# Patient Record
Sex: FEMALE | Race: WHITE | Employment: OTHER | URBAN - METROPOLITAN AREA
[De-identification: names, ages, dates, MRNs, and addresses within clinical notes are randomized per-mention and may not be internally consistent; named-entity substitution may affect disease eponyms.]

---

## 2017-01-10 ENCOUNTER — APPOINTMENT (OUTPATIENT)
Dept: PHYSICAL THERAPY | Age: 77
End: 2017-01-10

## 2017-01-13 ENCOUNTER — HOSPITAL ENCOUNTER (OUTPATIENT)
Dept: PHYSICAL THERAPY | Age: 77
Discharge: HOME | End: 2017-01-13
Payer: MEDICARE

## 2017-01-13 PROCEDURE — 97112 NEUROMUSCULAR REEDUCATION: CPT

## 2017-01-13 PROCEDURE — G8978 MOBILITY CURRENT STATUS: HCPCS

## 2017-01-13 PROCEDURE — 97162 PT EVAL MOD COMPLEX 30 MIN: CPT

## 2017-01-13 PROCEDURE — G8979 MOBILITY GOAL STATUS: HCPCS

## 2017-01-13 PROCEDURE — 97016 VASOPNEUMATIC DEVICE THERAPY: CPT

## 2017-01-13 NOTE — PROGRESS NOTES
PT DAILY TREATMENT NOTE - [facility]     Patient Name: Quinten Mayberry  Date:2017  : 1940  [x]  Patient  Verified  Payor: Александр Solders / Plan: [place] MED[place] PART [place] & B / Product Type: Medicare /    In time:210  Out time:305  Total Treatment Time (min): 55  Total Timed Codes (min): 8  1:1 Treatment Time ( only): 39   Visit #: 1 of 8    Treatment Area: Chronic pain of right knee [M25.561, G89.29]    SUBJECTIVE  Pain Level (0-10 scale): 6/10 seated at rest  Any medication changes, allergies to medications, adverse drug reactions, diagnosis change, or new procedure performed?: [x] No    [] Yes (see summary sheet for update)  Subjective functional status/changes:   [] No changes reported  See POC    OBJECTIVE    Modality rationale: decrease edema, decrease inflammation and decrease pain to improve the patients ability to increase activity/position tolerance   Min Type Additional Details    [] Estim:  []Unatt       []IFC  []Premod                        []Other:  []w/ice   []w/heat  Position:  Location:    [] Estim: []Att    []TENS instruct  []NMES                    []Other:  []w/US   []w/ice   []w/heat  Position:  Location:    []  Traction: [] Cervical       []Lumbar                       [] Prone          []Supine                       []Intermittent   []Continuous Lbs:  [] before manual  [] after manual    []  Ultrasound: []Continuous   [] Pulsed                           []1MHz   []3MHz W/cm2:  Location:    []  Iontophoresis with dexamethasone         Location: [] Take home patch   [] In clinic    []  Ice     []  heat  []  Ice massage  []  Laser   []  Anodyne Position:  Location:    []  Laser with stim  []  Other:  Position:  Location:   10 [x]  Vasopneumatic Device Pressure:       [] lo [x] med [] hi   Temperature: [x] lo [] med [] hi   [] Skin assessment post-treatment:  []intact []redness- no adverse reaction    []redness  adverse reaction:     37 min [x]Eval                  []Re-Eval        min Therapeutic Exercise:  [] See flow sheet :        min Therapeutic Activity:  []  See flow sheet :        8 min Neuromuscular Re-education:  []  See flow sheet : KT application to right knee, 3 I strips for stability/support   Rationale: increase proprioception and decrease pain  to improve the patients ability to normalize gait and function     min Manual Therapy:        min Gait Training:  ___ feet with ___ device on level surfaces with ___ level of assist   Rationale: With   [] TE   [] TA   [] neuro   [] other: Patient Education: [x] Review HEP    [] Progressed/Changed HEP based on:   [] positioning   [] body mechanics   [] transfers   [] heat/ice application    [] other:      Other Objective/Functional Measures:   Physical Therapy Evaluation - Knee  Chronic right knee pain with pt reporting severe arthritis from xray. Pain worsened with sitting and lying down.  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  PLOF: chronic knee pain with ADL's  Present Functional Limitations: sitting for prolonged periods      Gait:  [] Normal    [] Abnormal    [x] Antalgic    [] NWB    Device: none    Describe:    ROM / Strength  [] Unable to assess                  AROM                      PROM                   Strength (1-5)    Left Right Left Right Left Right   Hip Flexion     4/5 4-/5    Extension          Abduction          Adduction         Knee Flexion 125 90   4+/5 4/5    Extension 3 20   4+/5 4/5   Ankle Plantarflexion          Dorsiflexion     5/5 5/5       Flexibility: [] Unable to assess at this time  Hamstrings:    (L) Tightness= [] WNL   [] Min   [] Mod   [x] Severe  30 degrees    (R) Tightness= [] WNL   [] Min   [] Mod   [x] Severe  35 degrees  Quadriceps:    (L) Tightness= [] WNL   [] Min   [] Mod   [] Severe    (R) Tightness= [] WNL   [] Min   [] Mod   [] Severe  Gastroc:      (L) Tightness= [] WNL   [] Min   [] Mod   [] Severe    (R) Tightness= [] WNL   [] Min   [] Mod   [] Severe  Other:    Palpation:   Neg/Pos  Neg/Pos  Neg/Pos   Joint Line + Quad tendon  Patellar ligament    Patella  Fibular head  Pes Anserinus    Tibial tubercle  Hamstring tendons  Infrapatellar fat pad      Optional Tests:  Patellar Positioning (Static)   []L []R Normal []L []R Lateral   []L []R Tedra Likens      []L []R Medial   []L []R [place]    Patellar Tracking   []L []R Glide (Lat)   []L []R Tilt (Lat)     []L []R Glide (Med)  []L []R Tilt (Med)      []L []R Tile (Inf)     Patellar Mobility   []L []R Hypermobile []L []R Hypomobile         Girth Measurements:     Cm at  Cm above joint line   Cm at   Cm below joint line  Cm at joint line   Left     49.5   Right      50     Lachmans  [] Neg    [] Pos Posterior Drawer [] Neg    [] Pos  Pivot Shift  [] Neg    [] Pos Posterior Sag  [] Neg    [] Pos  TANISHA   [] Neg    [] Pos Inga's Test [] Neg    [] Pos  ALRI   [] Neg    [] Pos Squat   [] Neg    [] Pos  Valgus@ 0 Degrees [] Neg    [] Pos Elayne-Shahid [] Neg    [] Pos  Valgus@ 30 Degrees [] Neg    [] Pos Patellar Apprehension [] Neg    [] Pos  Varus@ 0 Degrees [] Neg    [] Pos Cosby's Compression [] Neg    [] Pos  Varus@ 30 Degrees [] Neg    [] Pos Ely's Test  [] Neg    [] Pos  Apley's Compression [] Neg    [] Pos Jeremiah's Test  [] Neg    [] Pos  Apley's Distraction [] Neg    [] Pos Stroke Test  [] Neg    [] Pos   Anterior Drawer [] Neg    [] Pos Fluctuation Test [] Neg    [] Pos  Other:                  [] Neg    [] Pos                 Other tests/comments:         Pain Level (0-10 scale) post treatment: 4/10    ASSESSMENT/Changes in Function: see POC    Patient will continue to benefit from skilled PT services to modify and progress therapeutic interventions, address functional mobility deficits, address ROM deficits, address strength deficits, analyze and address soft tissue restrictions, analyze and cue movement patterns and assess and modify postural abnormalities to attain remaining goals.      [x]  See Plan of Care  []  See progress note/recertification  []  See Discharge Summary         Progress towards goals / Updated goals:  See POC    PLAN  [x]  Upgrade activities as tolerated     [x]  Continue plan of care  []  Update interventions per flow sheet       []  Discharge due to:_  [x]  Other: ROM/stretching, strengthening/stability, mod prn, taping      Jennifer Rodas, PT 1/13/2017  2:06 PM    No future appointments.

## 2017-01-13 NOTE — PROGRESS NOTES
[facility] at [address] [place] [address]  Phone: [phone]   Fax: [phone]    Plan of Care/ Statement of Necessity for Physical Therapy Services    Patient name: Christin Cordoba Start of Care: 2017   Referral source: Gracie Liu MD : 1940    Medical Diagnosis: Chronic pain of right knee [M25.561, G89.29]   Onset Date:chronic    Treatment Diagnosis: right knee pain   Prior Hospitalization: see medical history Provider#: [id number]   Medications: Verified on Patient summary List    Comorbidities: arthritis, thyroid problems   Prior Level of Function: chronic knee pain with ADL's      The Plan of Care and following information is based on the information from the initial evaluation. Assessment/ key information: Pt is a 67 yo female presenting to clinic with c/o chronic right knee pain with pt reporting severe arthritis on xray. 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 On exam pt has decreased right knee AROM: 20-90, significant HS tightness, right>left, decreased right knee strength, and moderate to significant tenderness to palpation about medial and lateral knee structures. She ambulates with antalgic gait pattern right LE. Evaluation Complexity History MEDIUM  Complexity : 1-2 comorbidities / personal factors will impact the outcome/ POC ; Examination MEDIUM Complexity : 3 Standardized tests and measures addressing body structure, function, activity limitation and / or participation in recreation  ;Presentation MEDIUM Complexity : Evolving with changing characteristics  ; Clinical Decision Making MEDIUM Complexity : FOTO score of 26-74  Overall Complexity Rating: MEDIUM  Problem List: pain affecting function, decrease ROM, decrease strength, edema affecting function, impaired gait/ balance, decrease ADL/ functional abilitiies, decrease activity tolerance and decrease flexibility/ joint mobility   Treatment Plan may include any combination of the following: Therapeutic exercise, Therapeutic activities, Neuromuscular re-education, Physical agent/modality, Gait/balance training, Manual therapy, Aquatic therapy and Patient education  Patient / Family readiness to learn indicated by: asking questions, trying to perform skills and interest  Persons(s) to be included in education: patient (P)  Barriers to Learning/Limitations: None  Patient Goal (s): pain relief and some flexibility  Patient Self Reported Health Status: good  Rehabilitation Potential: good    Short Term Goals: To be accomplished in 2 weeks:  1. Patient will be independent and compliant with HEP to achieve other goals. Status at eval: not independent/compliant  2. Increase right knee AROM ext </= 10 degrees to normalize gait. Status at eval: 20 degrees    Long Term Goals: To be accomplished in 4 weeks:  1. Improve FOTO score to >/= 53/100 to indicate decreased pain with ADL's. Status at eval: 32/100  2. Increase right knee AROM flex >/= 110 degrees to normalize ADL's. Status at eval: 90 degrees  3. Increase right HS flexibility by >/= 10 degrees to normalize function. Status at eval: 35 degrees  4. Increase right knee strength >/= 4+/5 to normalize function. Status at eval: 4/5    Frequency / Duration: Patient to be seen 2 times per week for 4 weeks. Patient/ Caregiver education and instruction: Diagnosis, prognosis, other ice application, purpose of KT application   [x]  Plan of care has been reviewed with PTA    G-Codes (GP)  Mobility   Current  CL= 60-79%   Goal  CK= 40-59%    The severity rating is based on clinical judgment and the FOTO score. Certification Period: 1/13/17 - 2/11/17  Renee Liao, PT 1/13/2017 3:08 PM  _____________________________________________________________________  I certify that the above Therapy Services are being furnished while the patient is under my care.  I agree with the treatment plan and certify that this therapy is necessary.     Physician's Signature:____________________  Date:__________Time:______    Please sign and return to   [facility] at [address] [place] [address]  Phone: [phone]   Fax: [phone]

## 2017-01-18 ENCOUNTER — HOSPITAL ENCOUNTER (OUTPATIENT)
Dept: PHYSICAL THERAPY | Age: 77
Discharge: HOME | End: 2017-01-18
Payer: MEDICARE

## 2017-01-18 PROCEDURE — 97110 THERAPEUTIC EXERCISES: CPT

## 2017-01-18 PROCEDURE — 97140 MANUAL THERAPY 1/> REGIONS: CPT

## 2017-01-18 NOTE — PROGRESS NOTES
PT DAILY TREATMENT NOTE - [facility]     Patient Name: Noreen Apple  Date:2017  : 1940  [x]  Patient  Verified  Payor: Hung Leyva / Plan: [place] MEDICARE PART A & B / Product Type: Medicare /    In time: 2:05  Out time: 3:20  Total Treatment Time (min): 75  Total Timed Codes (min): 60  1:1 Treatment Time ( [address] only): 40   Visit #: 2 of 8  Treatment Area: Chronic pain of right knee [M25.561, G89.29]    SUBJECTIVE  Pain Level (0-10 scale): 5  Any medication changes, allergies to medications, adverse drug reactions, diagnosis change, or new procedure performed?: [x] No    [] Yes (see summary sheet for update)  Subjective functional status/changes:   [] No changes reported  \"The tape really helped last time. It came off yesterday. The compression and ice really helped a lot too. I had less swelling\". OBJECTIVE    Modality rationale: decrease edema, decrease inflammation and decrease pain to improve the patients ability to perform functional mobility/ADLs and attain goals.    Min Type Additional Details    [] Estim:  []Unatt       []IFC  []Premod                        []Other:  []w/ice   []w/heat  Position:  Location:    [] Estim: []Att    []TENS instruct  []NMES                    []Other:  []w/US   []w/ice   []w/heat  Position:  Location:    []  Traction: [] Cervical       []Lumbar                       [] Prone          []Supine                       []Intermittent   []Continuous Lbs:  [] before manual  [] after manual    []  Ultrasound: []Continuous   [] Pulsed                           []1MHz   []3MHz W/cm2:  Location:    []  Iontophoresis with dexamethasone         Location: [] Take home patch   [] In clinic    []  Ice     []  heat  []  Ice massage  []  Laser   []  Anodyne Position:  Location:    []  Laser with stim  []  Other:  Position:  Location:   10 [x]  Vasopneumatic Device Pressure:       [] lo [x] med [] hi   Temperature: [x] lo [] med [] hi   [] Skin assessment post-treatment:  []intact []redness- no adverse reaction    []redness  adverse reaction:         25 (45) min Therapeutic Exercise:  [x] See flow sheet :   Rationale: increase ROM and increase strength to improve the patients ability to perform functional mobility/ADLs and attain goals. 15 min Manual Therapy:  STM to right hamstrings/gastroc in prone. Manual quad stretch 2x30\" in prone. Patellar mobs all planes grade 2. PROM knee flex/ext in supine. K-Taping to right knee joint; 3 \"I\" strips for stability/support   Rationale: decrease pain, increase ROM and increase tissue extensibility to perform functional mobility/ADLs and attain goals. With   [] TE   [] TA   [] neuro   [] other: Patient Education: [x] Review HEP    [] Progressed/Changed HEP based on:   [] positioning   [] body mechanics   [] transfers   [] heat/ice application    [] other:      Other Objective/Functional Measures:   -new exercises added as per flow sheet with vc's provided for form/technique 100% of the time.   -significant hypertonicity to medial>lateral hamstrings and gastrocs (addressed with manual treatment)      Pain Level (0-10 scale) post treatment: 4    ASSESSMENT/Changes in Function: Pt with significant hip weakness as per inability to achieve LE clearance from table with SLR extension and c/o fatigue/pulling. Limited AROM tolerance today, however noted improvement post manual treatment and good tolerance for PROM, though decreased tolerance for manual stretches and STM. Pt noted to have antalgic gait pattern during session and advised on benefit of ambulating with SPC to normalize gait and reduce secondary pain and compensations.      Patient will continue to benefit from skilled PT services to modify and progress therapeutic interventions, address functional mobility deficits, address ROM deficits, address strength deficits, analyze and address soft tissue restrictions, analyze and cue movement patterns and analyze and modify body mechanics/ergonomics to attain remaining goals. []  See Plan of Care  []  See progress note/recertification  []  See Discharge Summary         Progress towards goals / Updated goals:  Current goals in progress; eval completed last session    PLAN  []  Upgrade activities as tolerated     [x]  Continue plan of care  []  Update interventions per flow sheet       []  Discharge due to:_  []  Other:_      Gregorio Mcclure, PT 1/18/2017  12:30 PM    Future Appointments  Date Time Provider Paul Elam   1/18/2017 2:00 PM [facility] PT [facility]   1/20/2017 8:30 AM Jennifer Rodas PT [facility]   1/23/2017 2:30 PM Esha Loja PTA [facility]   1/25/2017 2:30 PM [address] [place] [address] [facility]   1/30/2017 1:00 PM PALMA FarrellHPMIRIAN [facility]   2/2/2017 1:00 PM Missael Rothman PT [facility]

## 2017-01-19 ENCOUNTER — APPOINTMENT (OUTPATIENT)
Dept: PHYSICAL THERAPY | Age: 77
End: 2017-01-19
Payer: MEDICARE

## 2017-01-20 ENCOUNTER — HOSPITAL ENCOUNTER (OUTPATIENT)
Dept: PHYSICAL THERAPY | Age: 77
Discharge: HOME | End: 2017-01-20
Payer: MEDICARE

## 2017-01-20 PROCEDURE — 97140 MANUAL THERAPY 1/> REGIONS: CPT

## 2017-01-20 PROCEDURE — 97530 THERAPEUTIC ACTIVITIES: CPT

## 2017-01-20 PROCEDURE — 97016 VASOPNEUMATIC DEVICE THERAPY: CPT

## 2017-01-20 NOTE — PROGRESS NOTES
PT DAILY TREATMENT NOTE - [facility]     Patient Name: Keshav Melvin  Date:2017  : 1940  [x]  Patient  Verified  Payor: Fidel Recinos / Plan: [place] MEDICARE PART A & B / Product Type: Medicare /    In time:8:33am  Out time:9:35am  Total Treatment Time (min): 62  Total Timed Codes (min): 62  1:1 Treatment Time ( [address] only): 29   Visit #: 3 of 8    Treatment Area: Chronic pain of right knee [M25.561, G89.29]    SUBJECTIVE  Pain Level (0-10 scale): 7  Any medication changes, allergies to medications, adverse drug reactions, diagnosis change, or new procedure performed?: [x] No    [] Yes (see summary sheet for update)  Subjective functional status/changes:   [] No changes reported  Pt arrived reporting increased pain after last session and attributed it to the nustep and \"intense\" exercise. She declined utilizing nustep to start d/t fear of exacerbating her pain. OBJECTIVE    29 min Therapeutic Exercise:  [x] See flow sheet :   Rationale: increase ROM and increase strength to improve the patients ability to improve ADL ease and gait mechanics. 14 min Manual Therapy:  KT tape to R PTFJ with 3 strips, patellar lift. R grade 2-3 inf, med, lat patellar mobilizations, R knee PROM emphasis on ext, STM to R HS and gastroc soleus emphasis on lateral gastroc   Rationale: decrease pain, increase ROM, increase tissue extensibility and decrease trigger points to improve ease of knee mobility to improve gait pattern. 9 min Therapeutic Activity:  []  See flow sheet :   Rationale: extensive pt education regarding diagnosis, prognosis, fear avoidant behavior, and progression of treatment including the need to gradually progress addressing her R knee and that initially it may be somewhat uncomfortable and that treatment can and will be modified to her tolerance based on how she responds to treatment to improve the patients ability to improve pt compliance and adherence to plan of care.     Modality rationale: decrease edema, decrease inflammation and decrease pain to improve the patients ability to improve ADL ease. Min Type Additional Details    [] Estim:  []Unatt       []IFC  []Premod                        []Other:  []w/ice   []w/heat  Position:  Location:    [] Estim: []Att    []TENS instruct  []NMES                    []Other:  []w/US   []w/ice   []w/heat  Position:  Location:    []  Traction: [] Cervical       []Lumbar                       [] Prone          []Supine                       []Intermittent   []Continuous Lbs:  [] before manual  [] after manual    []  Ultrasound: []Continuous   [] Pulsed                           []1MHz   []3MHz Location:  W/cm2:    []  Iontophoresis with dexamethasone         Location: [] Take home patch   [] In clinic    []  Ice     []  heat  []  Ice massage  []  Laser   []  Anodyne Position:  Location:    []  Laser with stim  []  Other: Position:  Location:   10 [x]  Vasopneumatic Device Pressure:       [] lo [x] med [] hi   Temperature: [] lo [] med [] hi   [] Skin assessment post-treatment:  []intact []redness- no adverse reaction    []redness  adverse reaction:           With   [] TE   [] TA   [] neuro   [] other: Patient Education: [x] Review HEP    [] Progressed/Changed HEP based on:   [] positioning   [] body mechanics   [] transfers   [] heat/ice application    [] other:      Other Objective/Functional Measures:     Extensive time as above taken to discuss pt concerns of knee pain exacerbation and her avoidance of interventions/activity    Demonstrates grossly limited R knee flexion and extension AROM/PROM    Limited tolerance to manual interventions    Reports \"increased support\" and some reduction in pain with R knee KT tape     Pain Level (0-10 scale) post treatment: 5    ASSESSMENT/Changes in Function: PT reports some reduction in pain post treatment today.  She declined multiple interventions d/t reports of pain exacerbation last visit and was educated regarding POC and rationale. Limited tolerance to manual interventions with significant TTP R R lateral gastroc and limited tolerance to knee flexion/ext towards end range. Continue per POC. Patient will continue to benefit from skilled PT services to modify and progress therapeutic interventions, address functional mobility deficits, address ROM deficits, address strength deficits, analyze and address soft tissue restrictions, analyze and cue movement patterns, analyze and modify body mechanics/ergonomics and instruct in home and community integration to attain remaining goals. []  See Plan of Care  []  See progress note/recertification  []  See Discharge Summary         Progress towards goals / Updated goals:  Short Term Goals: To be accomplished in 2 weeks:  1. Patient will be independent and compliant with HEP to achieve other goals. Status at eval: not independent/compliant  2. Increase right knee AROM ext </= 10 degrees to normalize gait. Grossly not met, not formally assessed 1/20/2017  Status at eval: 20 degrees     Long Term Goals: To be accomplished in 4 weeks:  1. Improve FOTO score to >/= 53/100 to indicate decreased pain with ADL's. Status at eval: 32/100  2. Increase right knee AROM flex >/= 110 degrees to normalize ADL's. Status at eval: 90 degrees  3. Increase right HS flexibility by >/= 10 degrees to normalize function. Status at eval: 35 degrees  4. Increase right knee strength >/= 4+/5 to normalize function.   Status at eval: 4/5    PLAN  [x]  Upgrade activities as tolerated     [x]  Continue plan of care  []  Update interventions per flow sheet       []  Discharge due to:_  []  Other:_      Wanda Das, PT, DPT, ATC, CSCS 1/20/2017  9:04 AM    Future Appointments  Date Time Provider Paul Elam   1/23/2017 10:00 AM PALMA Oshea [facility]   1/25/2017 2:30 PM Collette Sour, [place] [facility]   1/30/2017 1:00 PM [id number] [facility]   2/2/2017 1:00 PM PALMA Oshea [facility]

## 2017-01-23 ENCOUNTER — HOSPITAL ENCOUNTER (OUTPATIENT)
Dept: PHYSICAL THERAPY | Age: 77
Discharge: HOME | End: 2017-01-23
Payer: MEDICARE

## 2017-01-23 PROCEDURE — 97110 THERAPEUTIC EXERCISES: CPT

## 2017-01-23 PROCEDURE — 97140 MANUAL THERAPY 1/> REGIONS: CPT

## 2017-01-23 PROCEDURE — 97016 VASOPNEUMATIC DEVICE THERAPY: CPT

## 2017-01-23 NOTE — PROGRESS NOTES
PT DAILY TREATMENT NOTE - [facility]     Patient Name: Sabrina Valadez  Date:2017  : 1940  [x]  Patient  Verified  Payor: Ania Gavin / Plan: [place] MED[place] & B / Product Type: Medicare /    In time:10:04  Out time:11:05  Total Treatment Time (min): 61  Total Timed Codes (min): 51  1:1 Treatment Time ( [address] only): 46   Visit #: 4 of 8    Treatment Area: Chronic pain of right knee [M25.561, G89.29]    SUBJECTIVE  Pain Level (0-10 scale): 8/10  Any medication changes, allergies to medications, adverse drug reactions, diagnosis change, or new procedure performed?: [x] No    [] Yes (see summary sheet for update)  Subjective functional status/changes:   [x] No changes reported      OBJECTIVE    Modality rationale: decrease edema, decrease inflammation and decrease pain to improve the patients ability to walk   Min Type Additional Details    [] Estim:  []Unatt       []IFC  []Premod                        []Other:  []w/ice   []w/heat  Position:  Location:    [] Estim: []Att    []TENS instruct  []NMES                    []Other:  []w/US   []w/ice   []w/heat  Position:  Location:    []  Traction: [] Cervical       []Lumbar                       [] Prone          []Supine                       []Intermittent   []Continuous Lbs:  [] before manual  [] after manual    []  Ultrasound: []Continuous   [] Pulsed                           []1MHz   []3MHz W/cm2:  Location:    []  Iontophoresis with dexamethasone         Location: [] Take home patch   [] In clinic    []  Ice     []  heat  []  Ice massage  []  Laser   []  Anodyne Position:  Location:    []  Laser with stim  []  Other:  Position:  Location:   10 [x]  Vasopneumatic Device Pressure:       [] lo [x] med [] hi   Temperature: [x] lo [] med [] hi   [] Skin assessment post-treatment:  []intact []redness- no adverse reaction    []redness  adverse reaction:      min []Eval                  []Re-Eval       38 min Therapeutic Exercise:  [] See flow sheet : Rationale: increase ROM and increase strength to improve the patients ability to walk     min Therapeutic Activity:  []  See flow sheet :   Rationale:       min Neuromuscular Re-education:  []  See flow sheet :   Rationale:     13 min Manual Therapy:  Grade 2-3 patellar mobs in all directions but focus on inferior and medial, STM to hamstring and gastroc, kinesiotaping to support patellar movement   Rationale: decrease pain and increase tissue extensibility to improve her tolerance to household chores     min Gait Training:  ___ feet with ___ device on level surfaces with ___ level of assist   Rationale: With   [] TE   [] TA   [] neuro   [] other: Patient Education: [x] Review HEP    [] Progressed/Changed HEP based on:   [] positioning   [] body mechanics   [] transfers   [] heat/ice application    [] other:      Other Objective/Functional Measures: see goal review     Pain Level (0-10 scale) post treatment: 7/10    ASSESSMENT/Changes in Function: The pt continues to have difficulty with exercises and lacks full knee ROM especially into extension. She would benefit from continued exercises to strengthen her LE to improve her knee mechanics with daily mobility tasks. Patient will continue to benefit from skilled PT services to modify and progress therapeutic interventions, address functional mobility deficits, address ROM deficits, address strength deficits, analyze and address soft tissue restrictions, analyze and cue movement patterns, analyze and modify body mechanics/ergonomics, assess and modify postural abnormalities and instruct in home and community integration to attain remaining goals. [x]  See Plan of Care  []  See progress note/recertification  []  See Discharge Summary         Progress towards goals / Updated goals:  Short Term Goals: To be accomplished in 2 weeks:  1. Patient will be independent and compliant with HEP to achieve other goals.   Status at al: not independent/compliant  Current status: not met    2. Increase right knee AROM ext </= 10 degrees to normalize gait. Status at eval: 20 degrees  Current status: Progressing, has improved by 7 degrees and she is now only lacking by 13 degrees from neutral      Long Term Goals: To be accomplished in 4 weeks:  1. Improve FOTO score to >/= 53/100 to indicate decreased pain with ADL's. Status at eval: 32/100  Current status: not tested again until 5th visit    2. Increase right knee AROM flex >/= 110 degrees to normalize ADL's. Status at eval: 90 degrees  Current status: Progressing, 100 degrees     3. Increase right HS flexibility by >/= 10 degrees to normalize function. Status at eval: 35 degrees  Current status: no change    4. Increase right knee strength >/= 4+/5 to normalize function.   Status at eval: 4/5  Current status: no change    PLAN  [x]  Upgrade activities as tolerated     [x]  Continue plan of care  []  Update interventions per flow sheet       []  Discharge due to:_  []  Other:_      Luke Jimenez PT 1/23/2017  10:19 AM    Future Appointments  Date Time Provider Paul Elam   1/25/2017 2:30 PM Margaret Stewart THE [facility]   1/30/2017 1:00 PM PALMA aKy THE [facility]   2/2/2017 1:00 PM PALMA Kay THE [facility]

## 2017-01-25 ENCOUNTER — HOSPITAL ENCOUNTER (OUTPATIENT)
Dept: PHYSICAL THERAPY | Age: 77
Discharge: HOME | End: 2017-01-25
Payer: MEDICARE

## 2017-01-25 PROCEDURE — 97110 THERAPEUTIC EXERCISES: CPT

## 2017-01-25 PROCEDURE — 97140 MANUAL THERAPY 1/> REGIONS: CPT

## 2017-01-25 PROCEDURE — 97016 VASOPNEUMATIC DEVICE THERAPY: CPT

## 2017-01-25 NOTE — PROGRESS NOTES
PT DAILY TREATMENT NOTE - [facility]     Patient Name: Lilli Card  Date:2017  : 1940  [x]  Patient  Verified  Payor: [place] MEDICARE / Plan: [place] MEDICARE PART A & B / Product Type: Medicare /    In time:2:40  Out time:3:40  Total Treatment Time (min): 60  Total Timed Codes (min): 50  1:1 Treatment Time ([facility] only): 38   Visit #: 5 of 8    Treatment Area: Chronic pain of right knee [M25.561, G89.29]    SUBJECTIVE  Pain Level (0-10 scale): 8-9/10  Any medication changes, allergies to medications, adverse drug reactions, diagnosis change, or new procedure performed?: [x] No    [] Yes (see summary sheet for update)  Subjective functional status/changes:   [] No changes reported  The pt reports that her knee has still been really painful and now her hips are bothering her too.     OBJECTIVE    Modality rationale: decrease edema, decrease inflammation and decrease pain to improve the patients ability to walk   Min Type Additional Details    [] Estim:  []Unatt       []IFC  []Premod                        []Other:  []w/ice   []w/heat  Position:  Location:    [] Estim: []Att    []TENS instruct  []NMES                    []Other:  []w/US   []w/ice   []w/heat  Position:  Location:    []  Traction: [] Cervical       []Lumbar                       [] Prone          []Supine                       []Intermittent   []Continuous Lbs:  [] before manual  [] after manual    []  Ultrasound: []Continuous   [] Pulsed                           []1MHz   []3MHz W/cm2:  Location:    []  Iontophoresis with dexamethasone         Location: [] Take home patch   [] In clinic    []  Ice     []  heat  []  Ice massage  []  Laser   []  Anodyne Position:  Location:    []  Laser with stim  []  Other:  Position:  Location:   10 []  Vasopneumatic Device Pressure:       [] lo [] med [] hi   Temperature: [] lo [] med [] hi   [] Skin assessment post-treatment:  []intact []redness- no adverse reaction    []redness  adverse reaction:      min []Eval                  []Re-Eval       38 min Therapeutic Exercise:  [x] See flow sheet :   Rationale: increase ROM and increase strength to improve the patients ability to perform mobility tasks     min Therapeutic Activity:  []  See flow sheet :   Rationale:       min Neuromuscular Re-education:  []  See flow sheet :   Rationale:     12 min Manual Therapy:  STM to right gastroc and hamstrings, right grade 2-3 patellar mobs medial, lateral and inferior   Rationale: decrease pain and increase tissue extensibility to improve her tolerance to standing and walking     min Gait Training:  ___ feet with ___ device on level surfaces with ___ level of assist   Rationale: With   [] TE   [] TA   [] neuro   [] other: Patient Education: [x] Review HEP    [] Progressed/Changed HEP based on:   [] positioning   [] body mechanics   [] transfers   [] heat/ice application    [] other:      Other Objective/Functional Measures:      Pain Level (0-10 scale) post treatment: 6/10    ASSESSMENT/Changes in Function: The pt continues to be extremely sensitive to exercise and activity. She still has a lot of tenderness over her lateral hamstrings as well. She did tolerate today's treatment better than her previous one though. Patient will continue to benefit from skilled PT services to modify and progress therapeutic interventions, address functional mobility deficits, address ROM deficits, address strength deficits, analyze and address soft tissue restrictions, analyze and cue movement patterns, analyze and modify body mechanics/ergonomics, assess and modify postural abnormalities, address imbalance/dizziness and instruct in home and community integration to attain remaining goals.      [x]  See Plan of Care  []  See progress note/recertification  []  See Discharge Summary           PLAN  [x]  Upgrade activities as tolerated     [x]  Continue plan of care  []  Update interventions per flow sheet       []  Discharge due to:_  [] Other:_      Keith Moyer, PT 1/25/2017  2:44 PM    Future Appointments  Date Time Provider Paul Elam   1/30/2017 1:00 PM [address] [facility] THE [facility]   2/2/2017 1:00 PM Natalie Benson, PT [facility]

## 2017-01-30 ENCOUNTER — HOSPITAL ENCOUNTER (OUTPATIENT)
Dept: PHYSICAL THERAPY | Age: 77
Discharge: HOME | End: 2017-01-30
Payer: MEDICARE

## 2017-01-30 PROCEDURE — 97110 THERAPEUTIC EXERCISES: CPT

## 2017-01-30 PROCEDURE — 97140 MANUAL THERAPY 1/> REGIONS: CPT

## 2017-01-30 NOTE — PROGRESS NOTES
PT DAILY TREATMENT NOTE - [facility]    Patient Name: Jahaira Stephen  Date:2017  : 1940  [x]  Patient  Verified  Payor: [place] MEDICARE / Plan: [place] MEDICARE PART A & B / Product Type: Medicare /    In time:1:05  Out time: 2:05  Total Treatment Time (min): 60  Total Timed Codes (min): 50  1:1 Treatment Time ( [address] only): 40    Visit #: 6 of 8    Treatment Area: Chronic pain of right knee [M25.561, G89.29]    SUBJECTIVE  Pain Level (0-10 scale): 6  Any medication changes, allergies to medications, adverse drug reactions, diagnosis change, or new procedure performed?: [x] No    [] Yes (see summary sheet for update)  Subjective functional status/changes:   [x] No changes reported      OBJECTIVE  10 min Manual Therapy:  STM to right gastroc and hamstrings, right patellar mobs medial, lateral, inferior, superior   Rationale: decrease pain, increase ROM and increase tissue extensibility to improve patient's tolerance for ADLs    40 min Therapeutic Exercise:  [x] See flow sheet :   Rationale: increase ROM, increase strength and improve coordination to improve the patients ability to perform functional tasks    Modality rationale: decrease edema, decrease inflammation and decrease pain to improve the patients ability to tolerate ADLs   Min Type Additional Details    [] Estim:  []Unatt       []IFC  []Premod                        []Other:  []w/ice   []w/heat  Position:  Location:    [] Estim: []Att    []TENS instruct  []NMES                    []Other:  []w/US   []w/ice   []w/heat  Position:  Location:    []  Traction: [] Cervical       []Lumbar                       [] Prone          []Supine                       []Intermittent   []Continuous Lbs:  [] before manual  [] after manual    []  Ultrasound: []Continuous   [] Pulsed                           []1MHz   []3MHz Location:  W/cm2:    []  Iontophoresis with dexamethasone         Location: [] Take home patch   [] In clinic    []  Ice     []  heat  []  Ice massage  []  Laser   []  Anodyne Position:  Location:    []  Laser with stim  []  Other: Position:  Location:   10 [x]  Vasopneumatic Device Pressure:       [x] lo [] med [] hi   Temperature: [x] lo [] med [] hi   [] Skin assessment post-treatment:  []intact []redness- no adverse reaction    []redness  adverse reaction:   With   [] TE   [] TA   [] neuro   [] other: Patient Education: [x] Review HEP    [] Progressed/Changed HEP based on:   [] positioning   [] body mechanics   [] transfers   [] heat/ice application    [] other:      Other Objective/Functional Measures:      Pain Level (0-10 scale) post treatment: 5    ASSESSMENT/Changes in Function: Patient continues to be sensitive to exercise and manual work as demonstrated by unable to perform standing gastroc stretch 3x30\" (is only able to perform 2x30\") due to c/o pain and presents with much discomfort throughout manual. Will continue to progress as tolerated. Patient will continue to benefit from skilled PT services to modify and progress therapeutic interventions, address functional mobility deficits, address ROM deficits, address strength deficits, analyze and address soft tissue restrictions, analyze and cue movement patterns, assess and modify postural abnormalities and instruct in home and community integration to attain remaining goals.      []  See Plan of Care  []  See progress note/recertification  []  See Discharge Summary             PLAN  [x]  Upgrade activities as tolerated     [x]  Continue plan of care  []  Update interventions per flow sheet       []  Discharge due to:_  []  Other:_      Andriy Yoni 1/30/2017  1:21 PM    Future Appointments  Date Time Provider Paul Elam   2/2/2017 1:00 PM Maycol Oliveros THE [facility]   2/6/2017 11:00 AM PALMA Osuna THE [facility]   2/9/2017 2:00 PM LORETTA Van THE [facility]   2/14/2017 1:00 PM LORETTA Van THE [facility]   2/17/2017 2:00 PM Maycol Osuna THE [facility]   2/20/2017 2:00 PM LORETTA Coffey THE [facility]   2/24/2017 3:00 PM [facility]dy [facility]   2/28/2017 2:30 PM PALMA Reddy THE [facility]

## 2017-02-02 ENCOUNTER — HOSPITAL ENCOUNTER (OUTPATIENT)
Dept: PHYSICAL THERAPY | Age: 77
Discharge: HOME | End: 2017-02-02
Payer: MEDICARE

## 2017-02-02 PROCEDURE — 97140 MANUAL THERAPY 1/> REGIONS: CPT

## 2017-02-02 PROCEDURE — 97110 THERAPEUTIC EXERCISES: CPT

## 2017-02-02 NOTE — PROGRESS NOTES
PT DAILY TREATMENT NOTE - [facility]-16    Patient Name: Ruth Pittman  Date:2017  : 1940  [x]  Patient  Verified  Payor: [place] MEDICARE / Plan: [place] MEDICARE PART A & B / Product Type: Medicare /    In time:1:05  Out time:1:55  Total Treatment Time (min): 50  Total Timed Codes (min): 40  1:1 Treatment Time ([facility] only): 40   Visit #: 7 of 8    Treatment Area: Chronic pain of right knee [M25.561, G89.29]    SUBJECTIVE  Pain Level (0-10 scale): 4  Any medication changes, allergies to medications, adverse drug reactions, diagnosis change, or new procedure performed?: [x] No    [] Yes (see summary sheet for update)  Subjective functional status/changes:   [x] No changes reported      OBJECTIVE  Modality rationale: decrease pain to improve the patients ability to tolerate ADLs   Min Type Additional Details    [] Estim:  []Unatt       []IFC  []Premod                        []Other:  []w/ice   []w/heat  Position:  Location:    [] Estim: []Att    []TENS instruct  []NMES                    []Other:  []w/US   []w/ice   []w/heat  Position:  Location:    []  Traction: [] Cervical       []Lumbar                       [] Prone          []Supine                       []Intermittent   []Continuous Lbs:  [] before manual  [] after manual    []  Ultrasound: []Continuous   [] Pulsed                           []1MHz   []3MHz Location:  W/cm2:    []  Iontophoresis with dexamethasone         Location: [] Take home patch   [] In clinic    []  Ice     []  heat  []  Ice massage  []  Laser   []  Anodyne Position:  Location:    []  Laser with stim  []  Other: Position:  Location:   10 [x]  Vasopneumatic Device Pressure:       [] lo [x] med [] hi   Temperature: [x] lo [] med [] hi   [] Skin assessment post-treatment:  []intact []redness- no adverse reaction    []redness  adverse reaction:   30 min Therapeutic Exercise:  [x] See flow sheet :   Rationale: increase ROM, increase strength and improve coordination to improve the patients ability to tolerate ADLs    10 min Manual Therapy:  STM to right quad, hamstring, and calf; patellar mobs   Rationale: decrease pain, increase ROM and increase tissue extensibility to decrease pain      With   [] TE   [] TA   [] neuro   [] other: Patient Education: [x] Review HEP    [] Progressed/Changed HEP based on:   [] positioning   [] body mechanics   [] transfers   [] heat/ice application    [] other:      Other Objective/Functional Measures:      Pain Level (0-10 scale) post treatment: 6    ASSESSMENT/Changes in Function: Patient continues to present with difficulty during therex secondary to c/o pain, requiring rest breaks and unable to perform SLR even with maximal cueing. Patient winces during manual STM/TPR to R quad/hamstring/calf. Reports higher pain rating at end of session  however, states \"I don't wobble around the house as much anymore and I am getting better\". Will continue to progress as tolerated. Patient will continue to benefit from skilled PT services to modify and progress therapeutic interventions, address functional mobility deficits, address ROM deficits, address strength deficits, analyze and address soft tissue restrictions, analyze and cue movement patterns, analyze and modify body mechanics/ergonomics and assess and modify postural abnormalities to attain remaining goals.      []  See Plan of Care  []  See progress note/recertification  []  See Discharge Summary           PLAN  [x]  Upgrade activities as tolerated     [x]  Continue plan of care  []  Update interventions per flow sheet       []  Discharge due to:_  []  Other:_      Gene Palmer 2/2/2017  1:12 PM    Future Appointments  Date Time Provider Paul Elam   2/6/2017 11:00 AM Dianna Esquivel, PT [facility] THE [facility]   2/9/2017 2:00 PM LORETTA Silva THE [facility]   2/14/2017 1:00 PM LORETTA Silva THE [facility]   2/17/2017 2:00 PM Dianna Esquivel PT [facility] THE [facility]   2/20/2017 2:00 PM LORETTA Silva THE [facility] 2/24/2017 3:00 PM PALMA Sandhu THE [facility]   2/28/2017 2:30 PM PALMA Sandhu THE [facility]

## 2017-02-06 ENCOUNTER — HOSPITAL ENCOUNTER (OUTPATIENT)
Dept: PHYSICAL THERAPY | Age: 77
Discharge: HOME | End: 2017-02-06
Payer: MEDICARE

## 2017-02-06 PROCEDURE — 97140 MANUAL THERAPY 1/> REGIONS: CPT

## 2017-02-06 PROCEDURE — 97110 THERAPEUTIC EXERCISES: CPT

## 2017-02-06 PROCEDURE — 97016 VASOPNEUMATIC DEVICE THERAPY: CPT

## 2017-02-06 PROCEDURE — G8978 MOBILITY CURRENT STATUS: HCPCS

## 2017-02-06 PROCEDURE — G8979 MOBILITY GOAL STATUS: HCPCS

## 2017-02-06 NOTE — PROGRESS NOTES
[facility] at [address] [place] [address]  Phone: [phone]   Fax: [phone]    Continued Plan of Care/ Re-certification for Physical Therapy Services    Patient name: Wesley Roe Start of Care: 2017   Referral source: Naida Aldridge MD : 1940   Medical/Treatment Diagnosis: Chronic pain of right knee [M25.561, G89.29] Onset Date:chronic     Prior Hospitalization: see medical history Provider#: [id number]   Medications: Verified on Patient Summary List    Comorbidities: arthritis, thyroid problems  Prior Level of Function:chronic knee pain with ADLs    Visits from Start of Care: 8    Missed Visits: 0    The Plan of Care and following information is based on the patient's current status:  Short Term Goals: To be accomplished in 2 weeks:  1. Patient will be independent and compliant with HEP to achieve other goals. Status at eval: not independent/compliant  Current status: Progressing, pt issued HEP that she can start working with     2. Increase right knee AROM ext </= 10 degrees to normalize gait. Status at eval: 20 degrees  Current status: Progressing, has improved by 3 degrees and she is now only lacking by 17 degrees from neutral      Long Term Goals: To be accomplished in 4 weeks:  1. Improve FOTO score to >/= 53/100 to indicate decreased pain with ADL's. Status at eval: 32/100  Current status: not met, 28     2. Increase right knee AROM flex >/= 110 degrees to normalize ADL's. Status at eval: 90 degrees  Current status: Progressing, 102 degrees      3. Increase right HS flexibility by >/= 10 degrees to normalize function. Status at eval: 35 degrees  Current status: Not met, 32 degrees      4. Increase right knee strength >/= 4+/5 to normalize function.   Status at eval: 4/5  Current status: Progressing, flex 4/5, ext 4+/5    Key functional changes: the pt was able to stand and walk better during her middle few sessions but has started feeling worse again    Problems/ barriers to goal attainment: arthritic changes to joint and pt's low tolerance to exercises     Problem List: pain affecting function, decrease ROM, decrease strength, impaired gait/ balance, decrease ADL/ functional abilitiies, decrease activity tolerance, decrease flexibility/ joint mobility and decrease transfer abilities    Treatment Plan: Therapeutic exercise, Therapeutic activities, Neuromuscular re-education, Physical agent/modality, Gait/balance training, Manual therapy, Aquatic therapy, Patient education and Functional mobility training     Patient Goal (s) has been updated and includes: \"pain relief and some flexibility\"     Goals for this certification period to be accomplished in 8 treatments:  Patient will be independent and compliant with HEP to achieve other goals. Status at eval: not independent/compliant  Status at recert: Progressing, pt issued HEP that she can start working with     Increase right knee AROM ext </= 10 degrees to normalize gait. Status at eval: 20 degrees  Status at recert: Progressing, has improved by 3 degrees and she is now only lacking by 17 degrees from neutral      Improve FOTO score to >/= 53/100 to indicate decreased pain with ADL's. Status at eval: 99/258  Status at recert:  not met, 28     Increase right knee AROM flex >/= 110 degrees to normalize ADL's. Status at eval: 90 degrees  Status at recert:  Progressing, 111 degrees      Increase right HS flexibility by >/= 10 degrees to normalize function. Status at eval: 35 degrees  Status at recert:  Not met, 32 degrees      Increase right knee strength >/= 4+/5 to normalize function. Status at eval: 4/5  Current status: Progressing, flex 4/5, ext 4+/5    Frequency / Duration: Patient to be seen 2 times per week for 4 weeks:    Assessment / Recommendations: The pt is making some progress with therapy but is sensitive to exercises and manual treatments so she has to go slow and have gentle treatments at this point which makes her progress slower. She continues to have muscle restrictions which impact her movement pattern and knee ROM that should continue to be addressed. She made some improvements and would benefit from continuing therapy further to continue to slowly progress her as tolerated to reduce her pain levels and improve her ability to walk and perform household tasks. G-Codes (GP)  Mobility  [id number] Current  CL= 60-79%  [id number] Goal  CK= 40-59%    The severity rating is based on clinical judgment and the FOTO score. Certification Period: 2/6/2017 to 3/7/2017    Fanny Salguerosharath Jayesh, PT 2/6/2017 11:11 AM    ________________________________________________________________________  I certify that the above Therapy Services are being furnished while the patient is under my care. I agree with the treatment plan and certify that this therapy is necessary. [] I have read the above and request that my patient continue as recommended.   [] I have read the above report and request that my patient continue therapy with the following changes/special instructions: ______________________________________  [] I have read the above report and request that my patient be discharged from therapy    Physician's Signature:_______________________________Date:___________Time:__________    Please sign and return to   [facility] at [address]  Phone: [phone]   Fax: [phone]

## 2017-02-06 NOTE — PROGRESS NOTES
PT DAILY TREATMENT NOTE - [facility]     Patient Name: Lilli Card  Date:2017  : 1940  [x]  Patient  Verified  Payor: [place] MED[place]ARE / Plan: [place] MEDICARE [place] A & B / Product Type: Medicare /    In time:11:05  Out time:12:03  Total Treatment Time (min): 58  Total Timed Codes (min): 48  1:1 Treatment Time ( only): 48   Visit #: 8 of 8    Treatment Area: Chronic pain of right knee [M25.561, G89.29]    SUBJECTIVE  Pain Level (0-10 scale): 5/10  Any medication changes, allergies to medications, adverse drug reactions, diagnosis change, or new procedure performed?: [x] No    [] Yes (see summary sheet for update)  Subjective functional status/changes:   [] No changes reported  The pt reports that her pain was getting better but has started getting worse again after her past few sessions and she had to take several aspirin.     OBJECTIVE    Modality rationale: decrease edema, decrease inflammation and decrease pain to improve the patients ability to walk   Min Type Additional Details    [] Estim:  []Unatt       []IFC  []Premod                        []Other:  []w/ice   []w/heat  Position:  Location:    [] Estim: []Att    []TENS instruct  []NMES                    []Other:  []w/US   []w/ice   []w/heat  Position:  Location:    []  Traction: [] Cervical       []Lumbar                       [] Prone          []Supine                       []Intermittent   []Continuous Lbs:  [] before manual  [] after manual    []  Ultrasound: []Continuous   [] Pulsed                           []1MHz   []3MHz W/cm2:  Location:    []  Iontophoresis with dexamethasone         Location: [] Take home patch   [] In clinic    []  Ice     []  heat  []  Ice massage  []  Laser   []  Anodyne Position:  Location:    []  Laser with stim  []  Other:  Position:  Location:   10 [x]  Vasopneumatic Device Pressure:       [] lo [x] med [] hi   Temperature: [x] lo [] med [] hi   [] Skin assessment post-treatment:  []intact []redness- no adverse reaction    []redness  adverse reaction:      min []Eval                  []Re-Eval       33 min Therapeutic Exercise:  [x] See flow sheet :   Rationale: increase ROM and increase strength to improve the patients ability to walk     min Therapeutic Activity:  []  See flow sheet :   Rationale:       min Neuromuscular Re-education:  []  See flow sheet :   Rationale:     15 min Manual Therapy:  STM to quad, distal IT band and lateral hamstring and gastroc, patella mobs in all directions   Rationale: decrease pain, increase ROM and increase tissue extensibility to improve her tolerance to household tasks     min Gait Training:  ___ feet with ___ device on level surfaces with ___ level of assist   Rationale: With   [] TE   [] TA   [] neuro   [] other: Patient Education: [x] Review HEP    [] Progressed/Changed HEP based on:   [] positioning   [] body mechanics   [] transfers   [] heat/ice application    [] other:      Other Objective/Functional Measures: see recert     Pain Level (0-10 scale) post treatment: 4/10    ASSESSMENT/Changes in Function: see recert    Patient will continue to benefit from skilled PT services to modify and progress therapeutic interventions, address functional mobility deficits, address ROM deficits, address strength deficits, analyze and address soft tissue restrictions, analyze and cue movement patterns, analyze and modify body mechanics/ergonomics, assess and modify postural abnormalities, address imbalance/dizziness and instruct in home and community integration to attain remaining goals.      []  See Plan of Care  [x]  See progress note/recertification  []  See Discharge Summary         Progress towards goals / Updated goals:  See recert    PLAN  [x]  Upgrade activities as tolerated     [x]  Continue plan of care  []  Update interventions per flow sheet       []  Discharge due to:_  []  Other:_      Merly Smiley PT 2/6/2017  11:10 AM    Future Appointments  Date Time Provider Paul Marialuisa   2/9/2017 2:00 PM Mort Hacking, PTA [facility]   2/14/2017 1:00 PM Pacheco Valleing, PTA [facility]   2/17/2017 2:00 PM Jacobo Lovell PT [facility]   2/20/2017 2:00 PM Pacheco Hacandeing, PTA [facility]   2/24/2017 3:00 PM Jose Luis Ayala, PT [facility]   2/28/2017 2:30 PM Jose Luis Ayala, PT [facility]

## 2017-02-07 ENCOUNTER — APPOINTMENT (OUTPATIENT)
Dept: PHYSICAL THERAPY | Age: 77
End: 2017-02-07
Payer: MEDICARE

## 2017-02-09 ENCOUNTER — HOSPITAL ENCOUNTER (OUTPATIENT)
Dept: PHYSICAL THERAPY | Age: 77
Discharge: HOME | End: 2017-02-09
Payer: MEDICARE

## 2017-02-09 PROCEDURE — 97112 NEUROMUSCULAR REEDUCATION: CPT

## 2017-02-09 PROCEDURE — 97110 THERAPEUTIC EXERCISES: CPT

## 2017-02-09 NOTE — PROGRESS NOTES
PT DAILY TREATMENT NOTE - [facility]     Patient Name: Boone Alvarado  Date:2017  : 1940  [x]  Patient  Verified  Payor: Prosper Conway / Plan: [place] MEDICARE PART A & B / Product Type: Medicare /    In time:1402  Out time:1452  Total Treatment Time (min): 50  Total Timed Codes (min): 38  1:1 Treatment Time ( only): 38   Visit #: 10 of 16    Treatment Area: Chronic pain of right knee [M25.561, G89.29]    SUBJECTIVE  Pain Level (0-10 scale): 3/10  Any medication changes, allergies to medications, adverse drug reactions, diagnosis change, or new procedure performed?: [x] No    [] Yes (see summary sheet for update)  Subjective functional status/changes:   [] No changes reported  I am doing much better today.     OBJECTIVE    Modality rationale: decrease inflammation and decrease pain to improve the patients ability to improve gait and ADL's   Min Type Additional Details    [] Estim:  []Unatt       []IFC  []Premod                        []Other:  []w/ice   []w/heat  Position:  Location:    [] Estim: []Att    []TENS instruct  []NMES                    []Other:  []w/US   []w/ice   []w/heat  Position:  Location:    []  Traction: [] Cervical       []Lumbar                       [] Prone          []Supine                       []Intermittent   []Continuous Lbs:  [] before manual  [] after manual    []  Ultrasound: []Continuous   [] Pulsed                           []1MHz   []3MHz W/cm2:  Location:    []  Iontophoresis with dexamethasone         Location: [] Take home patch   [] In clinic   10 [x]  Ice     []  heat  []  Ice massage  []  Laser   []  Anodyne Position:supine  Location:left knee    []  Laser with stim  []  Other:  Position:  Location:   10 [x]  Vasopneumatic Device right knee Pressure:       [] lo [x] med [] hi   Temperature: [x] lo [] med [] hi   [x] Skin assessment post-treatment:  [x]intact [x]redness- no adverse reaction    []redness  adverse reaction:      min []Eval                  []Re-Eval 30 min Therapeutic Exercise:  [x] See flow sheet :   Rationale: increase ROM and increase strength to improve the patients ability to improve gait and function     min Therapeutic Activity:  []  See flow sheet :        8 min Neuromuscular Re-education:  [x]  See flow sheet :dynamic balance and kinesiotaping D toP medial and lateral patellar @50% to improve stability of right knee   Rationale: increase strength, improve coordination, improve balance and increase proprioception  to improve the patients ability to improve gait and ADL's     min Manual Therapy:          min Gait Training:  ___ feet with ___ device on level surfaces with ___ level of assist   Rationale: With   [] TE   [] TA   [] neuro   [] other: Patient Education: [x] Review HEP    [] Progressed/Changed HEP based on:   [] positioning   [] body mechanics   [] transfers   [x] heat/ice application    [] other:      Other Objective/Functional Measures:      Pain Level (0-10 scale) post treatment: 4/10    ASSESSMENT/Changes in Function: Pt reporting improvements in ADL's with decreasing pain today. Pt may benefit from aquatic PT to promote mobility and self management of pain and sx in LE's. Patient will continue to benefit from skilled PT services to modify and progress therapeutic interventions, address functional mobility deficits, address ROM deficits, address strength deficits, analyze and address soft tissue restrictions, analyze and cue movement patterns, analyze and modify body mechanics/ergonomics and instruct in home and community integration to attain remaining goals. []  See Plan of Care  [x]  See progress note/recertification. Discussed aquatic therex for self management of pain and sx.  []  See Discharge Summary         Progress towards goals / Updated goals:  Goals for this certification period to be accomplished in 8 treatments:  Patient will be independent and compliant with HEP to achieve other goals.   Status at eval: not independent/compliant  Status at recert: Progressing, pt issued HEP that she can start working with  Current: clarified positioning with stretches      Increase right knee AROM ext </= 10 degrees to normalize gait. Status at eval: 20 degrees  Status at recert: Progressing, has improved by 3 degrees and she is now only lacking by 17 degrees from neutral  Current: no change      Improve FOTO score to >/= 53/100 to indicate decreased pain with ADL's. Status at eval: 65/834  Status at recert:  not met, 28  Current:retest next visit      Increase right knee AROM flex >/= 110 degrees to normalize ADL's. Status at eval: 90 degrees  Status at recert:  Progressing, 286 degrees   Current: no change      Increase right HS flexibility by >/= 10 degrees to normalize function. Status at eval: 35 degrees  Status at recert: Not met, 32 degrees   Current: no change      Increase right knee strength >/= 4+/5 to normalize function.   Status at al: 4/5  Status at [facility]: Progressing, flex 4/5, ext 4+/5  Current:no change       PLAN  []  Upgrade activities as tolerated     [x]  Continue plan of care  []  Update interventions per flow sheet       []  Discharge due to:_  []  Other:_      Felix Strauss PTA 2/9/2017  2:02 PM    Future Appointments  Date Time Provider Paul Elam   2/14/2017 1:00 PM LORETTA Vera [facility]   2/17/2017 2:00 PM PALMA Ribera [facility]   2/20/2017 2:00 PM LORETTA Vera [facility]   2/24/2017 3:00 PM Christiano Peralta, PT [facility]   2/28/2017 2:30 PM Christiano Peralta, PALMA CRISOSTOMO [facility]

## 2017-02-10 ENCOUNTER — APPOINTMENT (OUTPATIENT)
Dept: PHYSICAL THERAPY | Age: 77
End: 2017-02-10
Payer: MEDICARE

## 2017-02-14 ENCOUNTER — HOSPITAL ENCOUNTER (OUTPATIENT)
Dept: PHYSICAL THERAPY | Age: 77
Discharge: HOME | End: 2017-02-14
Payer: MEDICARE

## 2017-02-14 PROCEDURE — 97110 THERAPEUTIC EXERCISES: CPT

## 2017-02-14 PROCEDURE — 97016 VASOPNEUMATIC DEVICE THERAPY: CPT

## 2017-02-14 PROCEDURE — 97140 MANUAL THERAPY 1/> REGIONS: CPT

## 2017-02-14 NOTE — PROGRESS NOTES
PT DAILY TREATMENT NOTE - [facility]     Patient Name: Ashok Mcginnis  Date:2017  : 1940  [x]  Patient  Verified  Payor: Liliya Tsang / Plan: [place] MEDICARE PART A & B / Product Type: Medicare /    In time:1300  Out time:1352  Total Treatment Time (min): 52  Total Timed Codes (min): 42  1:1 Treatment Time ( only): 30   Visit #: 10 of 16    Treatment Area: Chronic pain of right knee [M25.561, G89.29]    SUBJECTIVE  Pain Level (0-10 scale): 3/10  Any medication changes, allergies to medications, adverse drug reactions, diagnosis change, or new procedure performed?: [x] No    [] Yes (see summary sheet for update)  Subjective functional status/changes:   [] No changes reported  I was hurting from Thursday through  after the last visit.      OBJECTIVE    Modality rationale: decrease inflammation and decrease pain to improve the patients ability to improve gait and ADL's   Min Type Additional Details    [] Estim:  []Unatt       []IFC  []Premod                        []Other:  []w/ice   []w/heat  Position:  Location:    [] Estim: []Att    []TENS instruct  []NMES                    []Other:  []w/US   []w/ice   []w/heat  Position:  Location:    []  Traction: [] Cervical       []Lumbar                       [] Prone          []Supine                       []Intermittent   []Continuous Lbs:  [] before manual  [] after manual    []  Ultrasound: []Continuous   [] Pulsed                           []1MHz   []3MHz W/cm2:  Location:    []  Iontophoresis with dexamethasone         Location: [] Take home patch   [] In clinic    []  Ice     []  heat  []  Ice massage  []  Laser   []  Anodyne Position:  Location:    []  Laser with stim  []  Other:  Position:  Location:   10 [x]  Vasopneumatic Device right knee Pressure:       [] lo [x] med [] hi   Temperature: [x] lo [] med [] hi   [x] Skin assessment post-treatment:  [x]intact []redness- no adverse reaction    []redness  adverse reaction:      min []Eval []Re-Eval       30 min Therapeutic Exercise:  [x] See flow sheet :   Rationale: increase ROM and increase strength to improve the patients ability to improve gait and ADL's     min Therapeutic Activity:  []  See flow sheet :         min Neuromuscular Re-education:  []  See flow sheet :       12 min Manual Therapy:  STM to quad, distal IT band and lateral hamstring and gastroc, patella mobs in all directions on right LE   Rationale: decrease pain, increase ROM and increase tissue extensibility to improve ADL's     min Gait Training:  ___ feet with ___ device on level surfaces with ___ level of assist   Rationale: With   [] TE   [] TA   [] neuro   [] other: Patient Education: [x] Review HEP    [] Progressed/Changed HEP based on:   [] positioning   [] body mechanics   [] transfers   [] heat/ice application    [] other:      Other Objective/Functional Measures: see goal review FOTO:45     Pain Level (0-10 scale) post treatment: 3/10    ASSESSMENT/Changes in Function: Pt demonstrates limited tolerance for therex and is not consistent with HEP. Pt reports improvements in donning socks and shoes    Patient will continue to benefit from skilled PT services to modify and progress therapeutic interventions, address functional mobility deficits, address ROM deficits, address strength deficits, analyze and address soft tissue restrictions, analyze and cue movement patterns, analyze and modify body mechanics/ergonomics, assess and modify postural abnormalities, address imbalance/dizziness and instruct in home and community integration to attain remaining goals. []  See Plan of Care  [x]  See progress note/recertification  []  See Discharge Summary         Progress towards goals / Updated goals:  Goals for this certification period to be accomplished in 8 treatments:  Patient will be independent and compliant with HEP to achieve other goals.   Status at eval: not independent/compliant  Status at recert: Progressing, pt issued HEP that she can start working with  Current: inconsistent with HEP      Increase right knee AROM ext </= 10 degrees to normalize gait. Status at eval: 20 degrees  Status at recert: Progressing, has improved by 3 degrees and she is now only lacking by 17 degrees from neutral  Current: no change      Improve FOTO score to >/= 53/100 to indicate decreased pain with ADL's. Status at eval: 87/346  Status at recert: not met, 28  RWZAHQC:[id number]/396      Increase right knee AROM flex >/= 110 degrees to normalize ADL's. Status at eval: 90 degrees  Status at recert: Progressing, 700 degrees   Current: 105 degrees      Increase right HS flexibility by >/= 10 degrees to normalize function. Status at eval: 35 degrees  Status at recert: Not met, 32 degrees   Current: no change      Increase right knee strength >/= 4+/5 to normalize function. Status at eval: 4/5  Status at [facility]: Progressing, flex 4/5, ext 4+/5  Current:no change          PLAN  []  Upgrade activities as tolerated     [x]  Continue plan of care  []  Update interventions per flow sheet       []  Discharge due to:_  [x]  Other:_Aquatic session next visit.       Michelle Bradley PTA 2/14/2017  1:02 PM    Future Appointments  Date Time Provider Palu Elam   2/17/2017 12:00 PM LORETTA Raymond [facility]   2/20/2017 2:00 PM Satinder Raymond [facility]   2/24/2017 3:00 PM PALMA Burden [facility]   2/28/2017 2:30 PM PALMA BurdenHPMIRIAN [facility]

## 2017-02-17 ENCOUNTER — HOSPITAL ENCOUNTER (OUTPATIENT)
Dept: PHYSICAL THERAPY | Age: 77
Discharge: HOME | End: 2017-02-17
Payer: MEDICARE

## 2017-02-17 PROCEDURE — 97113 AQUATIC THERAPY/EXERCISES: CPT

## 2017-02-17 NOTE — PROGRESS NOTES
PT DAILY TREATMENT NOTE - [facility]     Patient Name: Case Hoffman  Date:2017  : 1940  [x]  Patient  Verified  Payor: Harsha Signs / Plan: [place] MEDICARE PART A & B / Product Type: Medicare /    In time:1150  Out time:1238  Total Treatment Time (min): 48  Total Timed Codes (min): 48  1:1 Treatment Time ( only): 48   Visit #: 11 of 16    Treatment Area: Chronic pain of right knee [M25.561, G89.29]    SUBJECTIVE  Pain Level (0-10 scale): 2.5/10  Any medication changes, allergies to medications, adverse drug reactions, diagnosis change, or new procedure performed?: [x] No    [] Yes (see summary sheet for update)  Subjective functional status/changes:   [] No changes reported  I am so happy that the water is warm. I love to swim so I will start getting in the pool.     OBJECTIVE    Modality rationale:    Min Type Additional Details    [] Estim:  []Unatt       []IFC  []Premod                        []Other:  []w/ice   []w/heat  Position:  Location:    [] Estim: []Att    []TENS instruct  []NMES                    []Other:  []w/US   []w/ice   []w/heat  Position:  Location:    []  Traction: [] Cervical       []Lumbar                       [] Prone          []Supine                       []Intermittent   []Continuous Lbs:  [] before manual  [] after manual    []  Ultrasound: []Continuous   [] Pulsed                           []1MHz   []3MHz W/cm2:  Location:    []  Iontophoresis with dexamethasone         Location: [] Take home patch   [] In clinic    []  Ice     []  heat  []  Ice massage  []  Laser   []  Anodyne Position:  Location:    []  Laser with stim  []  Other:  Position:  Location:    []  Vasopneumatic Device Pressure:       [] lo [] med [] hi   Temperature: [] lo [] med [] hi   [] Skin assessment post-treatment:  []intact []redness- no adverse reaction    []redness  adverse reaction:      min []Eval                  []Re-Eval       48 min Therapeutic Exercise:  [x] See flow sheet :aquatic session to promote self monitored exercise   Rationale: increase ROM, increase strength and improve coordination to improve the patients ability to improve function     min Therapeutic Activity:  []  See flow sheet :         min Neuromuscular Re-education:  []  See flow sheet :        min Manual Therapy:          min Gait Training:  ___ feet with ___ device on level surfaces with ___ level of assist   Rationale: With   [] TE   [] TA   [] neuro   [] other: Patient Education: [x] Review HEP Issued handout for aquatic HEP   [] Progressed/Changed HEP based on:   [] positioning   [] body mechanics   [] transfers   [] heat/ice application    [] other:      Other Objective/Functional Measures:      Pain Level (0-10 scale) post treatment: 4/10    ASSESSMENT/Changes in Function: Pt reported excellent tolerance for all shallow and dep water therex although did report increased pain when out of pool at end of session. Patient will continue to benefit from skilled PT services to modify and progress therapeutic interventions, address functional mobility deficits, address ROM deficits, address strength deficits, analyze and address soft tissue restrictions, analyze and cue movement patterns and analyze and modify body mechanics/ergonomics to attain remaining goals.      []  See Plan of Care  []  See progress note/recertification  []  See Discharge Summary           PLAN  []  Upgrade activities as tolerated     [x]  Continue plan of care  []  Update interventions per flow sheet       []  Discharge due to:_  []  Other:_      Rodney Gonzalez PTA 2/17/2017  3:46 PM    Future Appointments  Date Time Provider Paul Elam   2/20/2017 2:00 PM Rodney Gonzalez PTA [facility]   2/24/2017 3:00 PM Codi Door, PT [facility]   2/28/2017 2:30 PM Codi Door, PT [facility]

## 2017-02-20 ENCOUNTER — HOSPITAL ENCOUNTER (OUTPATIENT)
Dept: PHYSICAL THERAPY | Age: 77
Discharge: HOME | End: 2017-02-20
Payer: MEDICARE

## 2017-02-20 PROCEDURE — 97016 VASOPNEUMATIC DEVICE THERAPY: CPT

## 2017-02-20 PROCEDURE — 97110 THERAPEUTIC EXERCISES: CPT

## 2017-02-20 NOTE — PROGRESS NOTES
PT DAILY TREATMENT NOTE - [facility]     Patient Name: Wesley Roe  Date:2017  : 1940  [x]  Patient  Verified  Payor: [place] MEDICARE / Plan: [place] MEDICARE PART A & B / Product Type: Medicare /    In time:1400  Out time:1447  Total Treatment Time (min): 47  Total Timed Codes (min): 37  1:1 Treatment Time ([facility] only): 30   Visit #:     Treatment Area: Chronic pain of right knee [M25.561, G89.29]    SUBJECTIVE  Pain Level (0-10 scale): 3/10  Any medication changes, allergies to medications, adverse drug reactions, diagnosis change, or new procedure performed?: [x] No    [] Yes (see summary sheet for update)  Subjective functional status/changes:   [] No changes reported  I loved the pool but my back has bothered me more over the weekend. I chano try to use the gym before I am back in the clinic on Friday.     OBJECTIVE    Modality rationale: decrease inflammation and decrease pain to improve the patients ability to improve gait and ADL's   Min Type Additional Details    [] Estim:  []Unatt       []IFC  []Premod                        []Other:  []w/ice   []w/heat  Position:  Location:    [] Estim: []Att    []TENS instruct  []NMES                    []Other:  []w/US   []w/ice   []w/heat  Position:  Location:    []  Traction: [] Cervical       []Lumbar                       [] Prone          []Supine                       []Intermittent   []Continuous Lbs:  [] before manual  [] after manual    []  Ultrasound: []Continuous   [] Pulsed                           []1MHz   []3MHz W/cm2:  Location:    []  Iontophoresis with dexamethasone         Location: [] Take home patch   [] In clinic    []  Ice     []  heat  []  Ice massage  []  Laser   []  Anodyne Position:  Location:    []  Laser with stim  []  Other:  Position:  Location:   10 [x]  Vasopneumatic Device right knee Pressure:       [] lo [x] med [] hi   Temperature: [x] lo [] med [] hi   [x] Skin assessment post-treatment:  [x]intact []redness- no adverse reaction    []redness  adverse reaction:      min []Eval                  []Re-Eval       34 min Therapeutic Exercise:  [x] See flow sheet :   Rationale: increase ROM, increase strength and improve coordination to improve the patients ability to improve gait and ADL's     min Therapeutic Activity:  []  See flow sheet :         min Neuromuscular Re-education:  []  See flow sheet :       3 min Manual Therapy:  Kinesiotaping right knee with Y strip at 50%   Rationale: decrease pain and improev stability to improve gait and ADL's     min Gait Training:  ___ feet with ___ device on level surfaces with ___ level of assist   Rationale: With   [] TE   [] TA   [] neuro   [] other: Patient Education: [x] Review HEP    [] Progressed/Changed HEP based on:   [] positioning   [] body mechanics   [] transfers   [] heat/ice application    [] other:      Other Objective/Functional Measures: see goal review     Pain Level (0-10 scale) post treatment: 2/10    ASSESSMENT/Changes in Function: Pt performed well in the water last week but reports LBP today that improved with therex and vaso on right knee. Patient will continue to benefit from skilled PT services to modify and progress therapeutic interventions, address functional mobility deficits, address ROM deficits, address strength deficits, analyze and address soft tissue restrictions, analyze and cue movement patterns, analyze and modify body mechanics/ergonomics, assess and modify postural abnormalities, address imbalance/dizziness and instruct in home and community integration to attain remaining goals. []  See Plan of Care  [x]  See progress note/recertification  []  See Discharge Summary         Progress towards goals / Updated goals:  Goals for this certification period to be accomplished in 8 treatments:  Patient will be independent and compliant with HEP to achieve other goals.   Status at eval: not independent/compliant  Status at recert: Progressing, pt issued HEP that she can start working with  Current: inconsistent with HEP, although now motivated to get in the pool      Increase right knee AROM ext </= 10 degrees to normalize gait. Status at eval: 20 degrees  Status at recert: Progressing, has improved by 3 degrees and she is now only lacking by 17 degrees from neutral  Current: no new improvements since last tested with pain reported with end range of extension      Improve FOTO score to >/= 53/100 to indicate decreased pain with ADL's. Status at eval: 32/344  Status at recert: not met, 28  JKNYDUU:03/289      Increase right knee AROM flex >/= 110 degrees to normalize ADL's. Status at eval: 90 degrees  Status at recert: Progressing, 490 degrees   Current: 107 degrees      Increase right HS flexibility by >/= 10 degrees to normalize function. Status at eval: 35 degrees  Status at recert: Not met, 32 degrees   Current: Right HS in 90/90:35 degrees      Increase right knee strength >/= 4+/5 to normalize function. Status at [facility]: 4/5  Status at [facility]: Progressing, flex 4/5, ext 4+/5  Current:no change        PLAN  []  Upgrade activities as tolerated     [x]  Continue plan of care  []  Update interventions per flow sheet       []  Discharge due to:_  [x]  Other:_Prep for discharge by end of cert period.      Minna Kaplan, LORETTA 2/20/2017  2:02 PM    Future Appointments  Date Time Provider Paul Elam   2/24/2017 3:00 PM Asim Arango [facility]   2/28/2017 2:30 PM PALMA Arango [facility]

## 2017-02-21 ENCOUNTER — APPOINTMENT (OUTPATIENT)
Dept: PHYSICAL THERAPY | Age: 77
End: 2017-02-21
Payer: MEDICARE

## 2017-02-24 ENCOUNTER — HOSPITAL ENCOUNTER (OUTPATIENT)
Dept: PHYSICAL THERAPY | Age: 77
Discharge: HOME | End: 2017-02-24
Payer: MEDICARE

## 2017-02-24 PROCEDURE — 97016 VASOPNEUMATIC DEVICE THERAPY: CPT

## 2017-02-24 PROCEDURE — 97110 THERAPEUTIC EXERCISES: CPT

## 2017-02-24 NOTE — PROGRESS NOTES
PT DAILY TREATMENT NOTE - [facility]     Patient Name: Sabrina Valadez  Date:2017  : 1940  [x]  Patient  Verified  Payor: Ania Gavin / Plan: [place] MEDICARE PART A & B / Product Type: Medicare /    In time:300  Out time:340  Total Treatment Time (min): 40  Total Timed Codes (min): 30  1:1 Treatment Time ([facility] only): 30   Visit #: 13 of 16    Treatment Area: Chronic pain of right knee [M25.561, G89.29]    SUBJECTIVE  Pain Level (0-10 scale): 2.5 - 3/10  Any medication changes, allergies to medications, adverse drug reactions, diagnosis change, or new procedure performed?: [x] No    [] Yes (see summary sheet for update)  Subjective functional status/changes:   [] No changes reported  I did the pool on Wednesday for 30 minutes and I was wiped out afterwards. It feels good when I'm in there exercising.     OBJECTIVE    Modality rationale: decrease inflammation and decrease pain to improve the patients ability to increase activity/position tolerance   Min Type Additional Details    [] Estim:  []Unatt       []IFC  []Premod                        []Other:  []w/ice   []w/heat  Position:  Location:    [] Estim: []Att    []TENS instruct  []NMES                    []Other:  []w/US   []w/ice   []w/heat  Position:  Location:    []  Traction: [] Cervical       []Lumbar                       [] Prone          []Supine                       []Intermittent   []Continuous Lbs:  [] before manual  [] after manual    []  Ultrasound: []Continuous   [] Pulsed                           []1MHz   []3MHz W/cm2:  Location:    []  Iontophoresis with dexamethasone         Location: [] Take home patch   [] In clinic    []  Ice     []  heat  []  Ice massage  []  Laser   []  Anodyne Position:  Location:    []  Laser with stim  []  Other:  Position:  Location:   10 [x]  Vasopneumatic Device Pressure:       [] lo [x] med [] hi   Temperature: [x] lo [] med [] hi   [] Skin assessment post-treatment:  []intact []redness- no adverse reaction    []redness  adverse reaction:      min []Eval                  []Re-Eval       30 min Therapeutic Exercise:  [x] See flow sheet : to include KT application right knee with Y strip   Rationale: increase ROM and increase strength to improve the patients ability to normalize gait and function     min Therapeutic Activity:  []  See flow sheet :         min Neuromuscular Re-education:  []  See flow sheet :        min Manual Therapy:         min Gait Training:  ___ feet with ___ device on level surfaces with ___ level of assist   Rationale: With   [] TE   [] TA   [] neuro   [] other: Patient Education: [x] Review HEP    [] Progressed/Changed HEP based on:   [] positioning   [] body mechanics   [] transfers   [] heat/ice application    [] other:      Other Objective/Functional Measures: none taken today     Pain Level (0-10 scale) post treatment: 3/10    ASSESSMENT/Changes in Function: Pt continues to ambulate with antalgic gait with limitations in end range knee ext. Patient will continue to benefit from skilled PT services to modify and progress therapeutic interventions, address ROM deficits, address strength deficits, analyze and address soft tissue restrictions, assess and modify postural abnormalities and address imbalance/dizziness to attain remaining goals.      []  See Plan of Care  [x]  See progress note/recertification  []  See Discharge Summary             PLAN  [x]  Upgrade activities as tolerated     [x]  Continue plan of care  []  Update interventions per flow sheet       []  Discharge due to:_  [x]  Other: prep for D/C at end of cert period      Renee Liao PT 2/24/2017  2:56 PM    Future Appointments  Date Time Provider Paul Elam   2/24/2017 3:00 PM PALMA Burden [facility]   2/28/2017 2:30 PM PALMA Burden [facility]   3/2/2017 11:00 AM PALMA Burden [facility]

## 2017-02-28 ENCOUNTER — HOSPITAL ENCOUNTER (OUTPATIENT)
Dept: PHYSICAL THERAPY | Age: 77
Discharge: HOME | End: 2017-02-28
Payer: MEDICARE

## 2017-02-28 PROCEDURE — 97110 THERAPEUTIC EXERCISES: CPT

## 2017-02-28 PROCEDURE — 97016 VASOPNEUMATIC DEVICE THERAPY: CPT

## 2017-02-28 NOTE — PROGRESS NOTES
PT DAILY TREATMENT NOTE - [facility]     Patient Name: Gilma Gray  Date:2017  : 1940  [x]  Patient  Verified  Payor: [place] MEDICARE / Plan: [place] MEDICARE PART A & B / Product Type: Medicare /    In time:1400  Out time:1449  Total Treatment Time (min): 49  Total Timed Codes (min): 39  1:1 Treatment Time ( [address] only): 30   Visit #: 14 of 16    Treatment Area: Chronic pain of right knee [M25.561, G89.29]    SUBJECTIVE  Pain Level (0-10 scale): 3/10  Any medication changes, allergies to medications, adverse drug reactions, diagnosis change, or new procedure performed?: [x] No    [] Yes (see summary sheet for update)  Subjective functional status/changes:   [] No changes reported  I like the pool but I tire so quickly.     OBJECTIVE    Modality rationale: decrease inflammation and decrease pain to improve the patients ability to improve gait and ADL's   Min Type Additional Details    [] Estim:  []Unatt       []IFC  []Premod                        []Other:  []w/ice   []w/heat  Position:  Location:    [] Estim: []Att    []TENS instruct  []NMES                    []Other:  []w/US   []w/ice   []w/heat  Position:  Location:    []  Traction: [] Cervical       []Lumbar                       [] Prone          []Supine                       []Intermittent   []Continuous Lbs:  [] before manual  [] after manual    []  Ultrasound: []Continuous   [] Pulsed                           []1MHz   []3MHz W/cm2:  Location:    []  Iontophoresis with dexamethasone         Location: [] Take home patch   [] In clinic    []  Ice     []  heat  []  Ice massage  []  Laser   []  Anodyne Position:  Location:    []  Laser with stim  []  Other:  Position:  Location:   10 [x]  Vasopneumatic Device right knee Pressure:       [] lo [x] med [] hi   Temperature: [x] lo [] med [] hi   [x] Skin assessment post-treatment:  [x]intact [x]redness- no adverse reaction    []redness  adverse reaction:      min []Eval                  []Re-Eval       39 min Therapeutic Exercise:  [x] See flow sheet :therex reviewed with v/c's fopr HS stretch positioning to ready for discharge   Rationale: increase ROM, increase strength and improve coordination to improve the patients ability to improve gait and ADL's     min Therapeutic Activity:  []  See flow sheet :         min Neuromuscular Re-education:  []  See flow sheet :        min Manual Therapy:          min Gait Training:  ___ feet with ___ device on level surfaces with ___ level of assist   Rationale: With   [] TE   [] TA   [] neuro   [] other: Patient Education: [x] Review HEP    [] Progressed/Changed HEP based on:   [] positioning   [] body mechanics   [] transfers   [] heat/ice application    [] other:      Other Objective/Functional Measures: see goal review     Pain Level (0-10 scale) post treatment: 2.5/10    ASSESSMENT/Changes in Function: Pt continues to be limited with end range extension on right knee that affects gait and ADL's. Patient will continue to benefit from skilled PT services to modify and progress therapeutic interventions, address functional mobility deficits, address ROM deficits, address strength deficits, analyze and address soft tissue restrictions, analyze and cue movement patterns, analyze and modify body mechanics/ergonomics and instruct in home and community integration to attain remaining goals. []  See Plan of Care  [x]  See progress note/recertification  []  See Discharge Summary         Progress towards goals / Updated goals:  Goals for this certification period to be accomplished in 8 treatments:  Patient will be independent and compliant with HEP to achieve other goals. Status at eval: not independent/compliant  Status at recert: Progressing, pt issued HEP that she can start working with  Current: inconsistent with HEP, although now motivated to get in the pool      Increase right knee AROM ext </= 10 degrees to normalize gait.    Status at eval: 20 degrees  Status at recert: Progressing, has improved by 3 degrees and she is now only lacking by 15 degrees from neutral  Current: no new improvements since last tested with pain reported with end range of extension      Improve FOTO score to >/= 53/100 to indicate decreased pain with ADL's. Status at eval: 57/830  Status at recert: not met, 28  VUPYCMI:25/993; retest next visit for discharge      Increase right knee AROM flex >/= 110 degrees to normalize ADL's. Status at eval: 90 degrees  Status at recert: Progressing, 134 degrees   Current: 108 degrees      Increase right HS flexibility by >/= 10 degrees to normalize function. Status at eval: 35 degrees  Status at recert: Not met, 32 degrees   Current: Right HS in 90/90:35 degrees; no change since last tested      Increase right knee strength >/= 4+/5 to normalize function. Status at eval: 4/5  Status at [facility]: Progressing, flex 4/5, ext 4+/5  Current:met with 4+/5        PLAN  []  Upgrade activities as tolerated     []  Continue plan of care  []  Update interventions per flow sheet       []  Discharge due to:_  [x]  Other:_Retest FOTO and discharge next visit.       Mason Fuentes, PTA 2/28/2017  2:00 PM    Future Appointments  Date Time Provider Paul Elam   3/2/2017 11:00 AM Jaleel Henry, PT [facility]

## 2017-03-02 ENCOUNTER — HOSPITAL ENCOUNTER (OUTPATIENT)
Dept: PHYSICAL THERAPY | Age: 77
Discharge: HOME | End: 2017-03-02
Payer: MEDICARE

## 2017-03-02 PROCEDURE — 97110 THERAPEUTIC EXERCISES: CPT

## 2017-03-02 PROCEDURE — G8979 MOBILITY GOAL STATUS: HCPCS

## 2017-03-02 PROCEDURE — G8980 MOBILITY D/C STATUS: HCPCS

## 2017-03-02 NOTE — PROGRESS NOTES
PT DAILY TREATMENT NOTE - [facility]     Patient Name: Azul Lopez  Date:3/2/2017  : 1940  [x]  Patient  Verified  Payor: [place] MEDICARE / Plan: VA MEDICARE PART A & B / Product Type: Medicare /    In time:1102  Out time:1157  Total Treatment Time (min): 55  Total Timed Codes (min): 55  1:1 Treatment Time ( only): 54   Visit #: 15 of 16    Treatment Area: Chronic pain of right knee [M25.561, G89.29]    SUBJECTIVE  Pain Level (0-10 scale): 210  Any medication changes, allergies to medications, adverse drug reactions, diagnosis change, or new procedure performed?: [x] No    [] Yes (see summary sheet for update)  Subjective functional status/changes:   [x] No changes reported      OBJECTIVE    Modality rationale:    Min Type Additional Details    [] Estim:  []Unatt       []IFC  []Premod                        []Other:  []w/ice   []w/heat  Position:  Location:    [] Estim: []Att    []TENS instruct  []NMES                    []Other:  []w/US   []w/ice   []w/heat  Position:  Location:    []  Traction: [] Cervical       []Lumbar                       [] Prone          []Supine                       []Intermittent   []Continuous Lbs:  [] before manual  [] after manual    []  Ultrasound: []Continuous   [] Pulsed                           []1MHz   []3MHz W/cm2:  Location:    []  Iontophoresis with dexamethasone         Location: [] Take home patch   [] In clinic    []  Ice     []  heat  []  Ice massage  []  Laser   []  Anodyne Position:  Location:    []  Laser with stim  []  Other:  Position:  Location:    []  Vasopneumatic Device Pressure:       [] lo [] med [] hi   Temperature: [] lo [] med [] hi   [] Skin assessment post-treatment:  []intact []redness- no adverse reaction    []redness  adverse reaction:      min []Eval                  []Re-Eval       55 min Therapeutic Exercise:  [] See flow sheet : updated and reviewed HEP and to include demonstration of KT application for pt to continue at home Rationale: increase ROM and increase strength to improve the patients ability to normalize gait and function     min Therapeutic Activity:  []  See flow sheet :        min Neuromuscular Re-education:  []  See flow sheet :        min Manual Therapy:          min Gait Training:  ___ feet with ___ device on level surfaces with ___ level of assist   Rationale: With   [] TE   [] TA   [] neuro   [] other: Patient Education: [x] Review HEP    [] Progressed/Changed HEP based on:   [] positioning   [] body mechanics   [] transfers   [] heat/ice application    [] other:      Other Objective/Functional Measures:   FOTO score: 41/100     Pain Level (0-10 scale) post treatment: 2/10    ASSESSMENT/Changes in Function: No additional improvement in FOTO score from previous assess: FOTO goal not met. Pt motivated to continue with HEP and use KT.       []  See Plan of Care  []  See progress note/recertification  [x]  See Discharge Summary        PLAN  []  Upgrade activities as tolerated     []  Continue plan of care  []  Update interventions per flow sheet       [x]  Discharge due to: completion of program with progress plateauing at this time  []  Other:_      Ellyn Melendrez PT 3/2/2017  11:06 AM    No future appointments.

## 2017-03-09 NOTE — PROGRESS NOTES
[facility] at [address] [place] [address]  Phone: [phone]   Fax: [phone]    Discharge Summary    Patient name: Laurie Mattson     Start of Care: 17  Referral source: Marcelline Goodell, MD    : 1940  Medical/Treatment Diagnosis: Chronic pain of right knee [M25.561, G89.29]  Onset Date:chronic  Prior Hospitalization: see medical history   Provider#: [id number]  Comorbidities: arthritis, thyroid problems  Prior Level of Function:chronic knee pain with ADL's  Medications: Verified on Patient Summary List    Visits from Start of Care: 15    Missed Visits: 0    Goals for this certification period to be accomplished in 8 treatments:  Patient will be independent and compliant with HEP to achieve other goals. Status at eval: not independent/compliant  Status at recert: Progressing, pt issued HEP that she can start working with  Current: inconsistent with HEP, although now motivated to get in the pool      Increase right knee AROM ext </= 10 degrees to normalize gait. Status at eval: 20 degrees  Status at recert: Progressing, has improved by 3 degrees and she is now only lacking by 15 degrees from neutral  Current: no new improvements since last tested with pain reported with end range of extension      Improve FOTO score to >/= 53/100 to indicate decreased pain with ADL's. Status at eval: 93/335  Status at recert: not met, 28  Current: progressin/100      Increase right knee AROM flex >/= 110 degrees to normalize ADL's. Status at eval: 90 degrees  Status at recert: Progressing, 647 degrees   Current: almost met: 108 degrees      Increase right HS flexibility by >/= 10 degrees to normalize function. Status at eval: 35 degrees  Status at recert: Not met, 32 degrees   Current: Right HS in 90/90:35 degrees; no change since last tested      Increase right knee strength >/= 4+/5 to normalize function.   Status at eval: 4/5  Status at [facility]: Progressing, flex 4/5, ext 4+/5  Current:met with 4+/5    G-Codes (GP)  Mobility    Goal  CK= 40-59%  D/C  CK= 40-59%    The severity rating is based on clinical judgment and the FOTO score. Assessment/ Summary of Care: Pt continues to be limited with end range extension on right knee that affects gait and ADL's. Treatment has included ROM/stretching, LE strengthening, KT application, and use of vasopneumatic device. Pt motivated to continue with HEP and use of KT.     RECOMMENDATIONS:  [x]Discontinue therapy: [x]Patient has reached or is progressing toward set goals      []Patient is non-compliant or has abdicated      []Due to lack of appreciable progress towards set goals    Christo Joseph, PT 3/9/2017 3:06 PM

## 2018-04-06 ENCOUNTER — HOSPITAL ENCOUNTER (OUTPATIENT)
Dept: PHYSICAL THERAPY | Age: 78
Discharge: HOME | End: 2018-04-06
Payer: MEDICARE

## 2018-04-06 PROCEDURE — G8979 MOBILITY GOAL STATUS: HCPCS

## 2018-04-06 PROCEDURE — 97162 PT EVAL MOD COMPLEX 30 MIN: CPT

## 2018-04-06 PROCEDURE — G8978 MOBILITY CURRENT STATUS: HCPCS

## 2018-04-06 PROCEDURE — 97014 ELECTRIC STIMULATION THERAPY: CPT

## 2018-04-06 NOTE — PROGRESS NOTES
PT DAILY TREATMENT NOTE - [facility]     Patient Name: Richard Ponce  Date:2018  : 1940  [x]  Patient  Verified  Payor: [place] MED[place]ARE / Plan: [place] MEDICARE PART A & B / Product Type: Medicare /    In time:1105  Out time:1150  Total Treatment Time (min): 45  Total Timed Codes (min): 0  1:1 Treatment Time ( [address] only): 39   Visit #: 1 of 8    Treatment Area: Low back pain [M54.5]    SUBJECTIVE  Pain Level (0-10 scale): 8/10 seated at rest  Any medication changes, allergies to medications, adverse drug reactions, diagnosis change, or new procedure performed?: [x] No    [] Yes (see summary sheet for update)  Subjective functional status/changes:   [] No changes reported  See POC    OBJECTIVE    Modality rationale: decrease pain and increase tissue extensibility to improve the patients ability to increase activity/position tolerance   Min Type Additional Details   15 with 5 min setup [x] Estim:  [x]Unatt       [x]IFC  []Premod                        []Other:  []w/ice   [x]w/heat  Position:seated  Location:neck and back    [] Estim: []Att    []TENS instruct  []NMES                    []Other:  []w/US   []w/ice   []w/heat  Position:  Location:    []  Traction: [] Cervical       []Lumbar                       [] Prone          []Supine                       []Intermittent   []Continuous Lbs:  [] before manual  [] after manual    []  Ultrasound: []Continuous   [] Pulsed                           []1MHz   []3MHz W/cm2:  Location:    []  Iontophoresis with dexamethasone         Location: [] Take home patch   [] In clinic    []  Ice     []  heat  []  Ice massage  []  Laser   []  Anodyne Position:  Location:    []  Laser with stim  []  Other:  Position:  Location:    []  Vasopneumatic Device Pressure:       [] lo [] med [] hi   Temperature: [] lo [] med [] hi   [] Skin assessment post-treatment:  []intact []redness- no adverse reaction    []redness  adverse reaction:     25 min [x]Eval                  []Re-Eval min Therapeutic Exercise:  [] See flow sheet :        min Therapeutic Activity:  []  See flow sheet :         min Neuromuscular Re-education:  []  See flow sheet :        min Manual Therapy:          min Gait Training:  ___ feet with ___ device on level surfaces with ___ level of assist   Rationale: With   [] TE   [] TA   [] neuro   [] other: Patient Education: [x] Review HEP    [] Progressed/Changed HEP based on:   [] positioning   [] body mechanics   [] transfers   [] heat/ice application    [] other:      Other Objective/Functional Measures:   Physical Therapy Evaluation - [facility] ([facility])    SUBJECTIVE  Chief Complaint: Pt assisting  out of bed 3 weeks ago and c/o back pain on right side ( receiving hospice care and has since passed away end of March of this year). Pain wakes pt up at night and difficulty getting to sleep. Pt denies LE radicular symptoms. Mechanism of injury: lifting pt    Symptoms:  Pain rating (0-10): Today:    Best:    Worst:    [x] Contstant:    [] Intermittent:     Aggravated by:   [x] Bending [x] Sitting [x] Standing [x] Walking   [] Moving [] Cough [] Sneeze [] Valsalva   [] AM  [] PM  Lying:  [] sup   [] pro   [] sidelying   [x] Other: lying on left side      Eased by:    [] Bending [] Sitting [] Standing [] Walking   [] Moving [] AM  [] PM  Lying: [] sup  [] pro  [] sidelying   [] Other:     General Health:  Red Flags Indicated? [] Yes    [] No  [] Yes [] No Recent weight change (If yes, due to dieting?  [] Yes  [] No)   [] Yes [] No Weakness in legs during walking  [] Yes [] No Unremitting pain at night  [] Yes [] No Abdominal pain or problems  [] Yes [] No Rectal bleeding  [] Yes [] No Feet more cold or painful in cold weather  [] Yes [] No Menstrual irregularities  [] Yes [] No Blood or pain with urination  [] Yes [x] No Dysfunction of bowel or bladder  [] Yes [] No Recent illness within past 3 weeks (i.e, cold, flu)  [] Yes [] No Numbness/tingling in buttock/genitalia region    Past History/Treatments:     Diagnostic Tests: [] Lab work [] X-rays    [] CT [] MRI     [] Other:  Results: none taken to date    Functional Status  Prior level of function: intermittent, manageable back pain with ADL's  Present functional limitations:  Walking, sitting, standing, lifting, bending, housework  What position do you sleep in?: right S/L    OBJECTIVE  Posture:  Lateral Shift: [] R    [] L     [] +  [] -  Kyphosis: [] Increased [] Decreased   []  WNL  Lordosis:  [] Increased [] Decreased   [] WNL  Pelvic symmetry: [] WNL    [] Other:    Gait:  [] Normal     [x] Abnormal:    Active Movements: [] N/A   [] Too acute   [] Other:  ROM % AROM % PROM Comments:pain, area   Forward flexion 40-60 Limited 25%     Extension 20-30 Limited 75%  Increased pain   SB right 20-30      SB left 20-30      Rotation right 5-10 Limited 50%     Rotation left 5-10 Limited 50%  Increased pain     Repeated Movements   Effects on present pain: produces (RI), abolishes (A), increases (incr), decreases (decr), centralizes (C), peripheral (PH), no effect (NE)   Pre-Test Sx Flexion Repeated Flexion Extension Repeated Extension Repeated SBL Repeated SBR   Sitting 1. 8/10 4. Increased PDM, NW        Standing 2. Mild increase in pain   3. Increased PDM, W      Lying 5.  H/L supine: 7/10     N/A N/A   Comments:  Side Glide:  Sustained passive positioning test:    Neuro Screen [] WNL  Myotome/Dermatome/Reflexes:  Comments:    Dural Mobility:  SLR Sitting: [] R    [] L    [] +    [] -  @ (degrees):           Supine: [] R    [] L    [] +    [] -  @ (degrees):   Slump Test: [] R    [] L    [] +    [] -  @ (degrees):   Prone Knee Bend: [] R    [] L    [] +    [] -     Palpation  [] Min  [x] Mod  [x] Severe    Location: B UT, levator scap, right mid/lower thoracic to lumbar paraspinal region  [] Min  [] Mod  [] Severe    Location:  [] Min  [] Mod  [] Severe    Location:    Stabilization Tests  Multifidus Test  Level 1: Prone abdominal draw in (Goal 6-10mmHG):  Level 2: Supported leg load supine (needle deflection at 40mmHG): [] Yes  [] No   Level 3: Unsupported leg load supine (needle deflection at 40mmHG): [] Yes  [] No     Strength   L(0-5) R (0-5) N/T   Hip Flexion (L1,2)   []   Knee Extension (L3,4)   []   Ankle Dorsiflexion (L4)   []   Great Toe Extension (L5)   []   Ankle Plantarflexion (S1)   []   Knee Flexion (S1,2)   []   Upper Abdominals   []   Lower Abdominals   []   Paraspinals   []   Back Rotators   []   Gluteus Porfirio   []   Other   []     Special Tests  Lumbar:  Lumb. Compression: [] Pos  [] Neg               Lumbar Distraction:   [] Pos  [] Neg    Quadrant:  [] Pos  [] Neg   [] Flex  [] Ext    Sacroilliac:  Gaenslen's: [] R    [] L    [] +    [] -     Compression: [] +    [] -     Gapping:  [] +    [] -     Thigh Thrust: [] R    [] L    [] +    [] -     Leg Length: [] +    [] -   Position:    Crests:    ASIS:    PSIS:    Sacral Sulcus:    Mobility: Standing flex:     Sitting flex:     Supine to sit:     Prone knee bend:         Hip: Teagan Hinton:  [] R    [] L    [] +    [] -     Scour:  [] R    [] L    [] +    [] -     Piriformis: [] R    [] L    [] +    [] -          Deficits: Jeremiah's: [] R    [] L    [] +    [] -     Nathen: [] R    [] L    [] +    [] -     Hamstrings 90/90: right: 30, left: 20    Gastrocsoleus (to neutral): Right: Left:       Global Muscular Weakness:  Abdominals:  Quadratus Lumborum:  Paraspinals:   Other:    Other tests/comments:           Pain Level (0-10 scale) post treatment: 3/10    ASSESSMENT/Changes in Function: see POC    Patient will continue to benefit from skilled PT services to modify and progress therapeutic interventions, address ROM deficits, address strength deficits, analyze and address soft tissue restrictions, analyze and cue movement patterns, analyze and modify body mechanics/ergonomics and assess and modify postural abnormalities to attain remaining goals.      [x]  See Plan of Care  []  See progress note/recertification  []  See Discharge Summary         Progress towards goals / Updated goals:  See POC      PLAN  [x]  Upgrade activities as tolerated     [x]  Continue plan of care  []  Update interventions per flow sheet       []  Discharge due to:_  [x]  Other: ROM/stretching, core stability, mod prn, manual techniques      Naina Chamberlain PT 4/6/2018  10:25 AM    Future Appointments  Date Time Provider Paul Elam   4/6/2018 11:00 AM Naina Chamberlain PT [facility]

## 2018-04-06 NOTE — PROGRESS NOTES
[facility] at [address] [place] [address]  Phone: [phone]   Fax: [phone]    Plan of Care/ Statement of Necessity for Physical Therapy Services    Patient name: Maren Vee Start of Care: 2018   Referral source: Bull Alcantar MD : 1940    Medical Diagnosis: Low back pain [M54.5]   Onset Date:2018    Treatment Diagnosis: back pain   Prior Hospitalization: see medical history Provider#: [id number]   Medications: Verified on Patient summary List    Comorbidities: thyroid problems, arthritis, hearing impaired   Prior Level of Function: intermittent, manageable back pain with ADL's      The Plan of Care and following information is based on the information from the initial evaluation. Assessment/ key information: Pt well-known to this clinic now presenting with c/o low back pain on right side and upper back pain B after assisting  out of bed 3 weeks ago ( receiving hospice care at the time and has since passed away end of March of this year). Pain wakes pt up at night and difficulty getting to sleep. Pt denies LE radicular symptoms. On exam, pt has decreased lumbar AROM all planes and TTP to B UT, levator scap, and right mid/lower thoracic to lumbar paraspinal region. Postural assessment reveals kyphosis and increased forward trunk lean. Signs/symptoms consistent with muscle strain. Pt would benefit from skilled PT intervention to address the findings.     Evaluation Complexity History MEDIUM  Complexity : 1-2 comorbidities / personal factors will impact the outcome/ POC ; Examination MEDIUM Complexity : 3 Standardized tests and measures addressing body structure, function, activity limitation and / or participation in recreation  ;Presentation MEDIUM Complexity : Evolving with changing characteristics  ; Clinical Decision Making MEDIUM Complexity : FOTO score of 26-74  Overall Complexity Rating: MEDIUM  Problem List: pain affecting function, decrease ROM, decrease strength, impaired gait/ balance, decrease ADL/ functional abilitiies, decrease activity tolerance and decrease flexibility/ joint mobility   Treatment Plan may include any combination of the following: Therapeutic exercise, Therapeutic activities, Neuromuscular re-education, Physical agent/modality, Manual therapy, Aquatic therapy and Patient education  Patient / Family readiness to learn indicated by: asking questions, trying to perform skills and interest  Persons(s) to be included in education: patient (P)  Barriers to Learning/Limitations: None  Patient Goal (s): help it all heal  Patient Self Reported Health Status: good  Rehabilitation Potential: good    Short Term Goals: To be accomplished in 2 weeks:  1. Patient will be independent and compliant with HEP to achieve other goals. Status at eval: issue on 2nd visit  2. Pt will report >/= 25% improvement in symptoms to increase activity/position tolerance. Status at eval: 0%    Long Term Goals: To be accomplished in 4 weeks:  1. Improve FOTO score to >/= 49/100 to indicate decreased pain with ADL's. Status at eval: 30/100  2. Pt will report >/= 50% improvement in symptoms to increase activity/position tolerance. Status at eval: 0%  3. Pt will have 75% of normal lumbar AROM without increased pain to normalize ADL's. Status at eval: ext: limited 75%, B rot: limited 50% with increased pain    Frequency / Duration: Patient to be seen 2 times per week for 4 weeks. Patient/ Caregiver education and instruction: Diagnosis, prognosis, other discussed POC   [x]  Plan of care has been reviewed with PTA    G-Codes (GP)  Mobility   Current  CL= 60-79%   Goal  CK= 40-59%     The severity rating is based on clinical judgment and the FOTO score.     Certification Period: 4/6/18 - 6/4/18  Durenda Habermann, PT 4/6/2018 10:27 AM  _____________________________________________________________________  I certify that the above Therapy Services are being furnished while the patient is under my care. I agree with the treatment plan and certify that this therapy is necessary.     Physician's Signature:____________________  Date:__________Time:______    Please sign and return to   [facility] at [address] [place] [address]  Phone: [phone]   Fax: [phone]

## 2018-04-10 ENCOUNTER — HOSPITAL ENCOUNTER (OUTPATIENT)
Dept: PHYSICAL THERAPY | Age: 78
Discharge: HOME | End: 2018-04-10
Payer: MEDICARE

## 2018-04-10 PROCEDURE — 97140 MANUAL THERAPY 1/> REGIONS: CPT

## 2018-04-10 PROCEDURE — 97112 NEUROMUSCULAR REEDUCATION: CPT

## 2018-04-10 PROCEDURE — 97014 ELECTRIC STIMULATION THERAPY: CPT

## 2018-04-10 NOTE — PROGRESS NOTES
PT DAILY TREATMENT NOTE - [facility]     Patient Name: Yusuf Moreno  Date:4/10/2018  : 1940  [x]  Patient  Verified  Payor: [place] MEDICARE / Plan: [place] MEDICARE PART A & B / Product Type: Medicare /    In time:115  Out time:205  Total Treatment Time (min): 50  Total Timed Codes (min): 35  1:1 Treatment Time ( only): 30   Visit #: 2 of 8    Treatment Area: Low back pain [M54.5]    SUBJECTIVE  Pain Level (0-10 scale): 7/10  Any medication changes, allergies to medications, adverse drug reactions, diagnosis change, or new procedure performed?: [x] No    [] Yes (see summary sheet for update)  Subjective functional status/changes:   [] No changes reported  Pt reports that she really liked the treatment last week and felt great for 24 hours    OBJECTIVE    Modality rationale: decrease pain and increase tissue extensibility to improve the patients ability to stand more than 30 minutes   Min Type Additional Details   15 [x] Estim:  [x]Unatt       [x]IFC  []Premod                        []Other:  []w/ice   [x]w/heat  Position:supine  Location:low back and upper trunk    [] Estim: []Att    []TENS instruct  []NMES                    []Other:  []w/US   []w/ice   []w/heat  Position:  Location:    []  Traction: [] Cervical       []Lumbar                       [] Prone          []Supine                       []Intermittent   []Continuous Lbs:  [] before manual  [] after manual    []  Ultrasound: []Continuous   [] Pulsed                           []1MHz   []3MHz W/cm2:  Location:    []  Iontophoresis with dexamethasone         Location: [] Take home patch   [] In clinic    []  Ice     []  heat  []  Ice massage  []  Laser   []  Anodyne Position:  Location:    []  Laser with stim  []  Other:  Position:  Location:    []  Vasopneumatic Device Pressure:       [] lo [] med [] hi   Temperature: [] lo [] med [] hi   [x] Skin assessment post-treatment:  [x]intact [x]redness- no adverse reaction    []redness  adverse reaction: 20  1:1  15 min min Neuromuscular Re-education:  [x]  See flow sheet :improve trunk mobility   Rationale: increase ROM, improve coordination and increase proprioception  to improve the patients ability to stand more than 30 minutes    15 min Manual Therapy:  Myofascial arm pulls bilaterally, SOR   Rationale: decrease pain, increase ROM, increase tissue extensibility, decrease edema  and decrease trigger points to stand more than 30 minutes            With   [] TE   [] TA   [x] neuro   [] other: Patient Education: [x] Review HEP    [] Progressed/Changed HEP based on:   [x] positioning   [] body mechanics   [] transfers   [x] heat/ice application    [x] other: TENS     Other Objective/Functional Measures:     Pain Level (0-10 scale) post treatment: 3/10    ASSESSMENT/Changes in Function: Pt demonstrates poor mobiity through trunk contributing to ongoing pain. She has moderate tolerance to  Myofascial work but seems to tolerate stretching better than strengthening activities. Encouraged pt to consider ordering a TENS unit so she can ameliorate pain at home    Patient will continue to benefit from skilled PT services to address functional mobility deficits, address ROM deficits, address strength deficits, analyze and address soft tissue restrictions, analyze and cue movement patterns, analyze and modify body mechanics/ergonomics and assess and modify postural abnormalities to attain remaining goals. []  See Plan of Care  []  See progress note/recertification  []  See Discharge Summary         Progress towards goals / Updated goals:  Short Term Goals: To be accomplished in 2 weeks:  1. Patient will be independent and compliant with HEP to achieve other goals. Status at eval: issue on 2nd visit  2. Pt will report >/= 25% improvement in symptoms to increase activity/position tolerance. Status at eval: 0%  Current: Improved after last treatment     Long Term Goals: To be accomplished in 4 weeks:  1.  Improve FOTO score to >/= 49/100 to indicate decreased pain with ADL's. Status at eval: 30/100  2. Pt will report >/= 50% improvement in symptoms to increase activity/position tolerance. Status at eval: 0%  3. Pt will have 75% of normal lumbar AROM without increased pain to normalize ADL's.   Status at eval: ext: limited 75%, B rot: limited 50% with increased pain       PLAN  [x]  Upgrade activities as tolerated     [x]  Continue plan of care  []  Update interventions per flow sheet       []  Discharge due to:_  []  Other:_      Naveed Cornell PTA 4/10/2018  12:57 PM    Future Appointments  Date Time Provider Paul Elam   4/10/2018 1:15 PM Michael CRISOSTOMO [facility]   4/13/2018 10:45 AM LORETTA Hanson [facility]   4/24/2018 3:30 PM PALMA James [facility]   4/26/2018 10:45 AM LORETTA Hanson [facility]

## 2018-04-13 ENCOUNTER — HOSPITAL ENCOUNTER (OUTPATIENT)
Dept: PHYSICAL THERAPY | Age: 78
Discharge: HOME | End: 2018-04-13
Payer: MEDICARE

## 2018-04-13 PROCEDURE — 97112 NEUROMUSCULAR REEDUCATION: CPT

## 2018-04-13 PROCEDURE — 97014 ELECTRIC STIMULATION THERAPY: CPT

## 2018-04-13 NOTE — PROGRESS NOTES
PT DAILY TREATMENT NOTE - [facility]     Patient Name: Gautam Duncan  Date:2018  : 1940  [x]  Patient  Verified  Payor: Daisy  / Plan: [place] MEDICARE PART A & B / Product Type: Medicare /    In time:1050  Out time:1135  Total Treatment Time (min): 45  Total Timed Codes (min): 35  1:1 Treatment Time ( only): 35   Visit #: 3 of 8    Treatment Area: Low back pain [M54.5]    SUBJECTIVE  Pain Level (0-10 scale): 7/10  Any medication changes, allergies to medications, adverse drug reactions, diagnosis change, or new procedure performed?: [x] No    [] Yes (see summary sheet for update)  Subjective functional status/changes:   [] No changes reported  Pt reports that her condition was aggravated by the NuStep and the manual work completed at last visit making her feel worse.   She does report that she ordered a TENS unit as recommended by the therapist at last visit    OBJECTIVE    Modality rationale: decrease pain and increase tissue extensibility to improve the patients ability to ambulate throughout the community   Min Type Additional Details   10 [x] Estim:  [x]Unatt       [x]IFC  []Premod                        []Other:  []w/ice   [x]w/heat  Position:seated  Location:low back and neck    [] Estim: []Att    []TENS instruct  []NMES                    []Other:  []w/US   []w/ice   []w/heat  Position:  Location:    []  Traction: [] Cervical       []Lumbar                       [] Prone          []Supine                       []Intermittent   []Continuous Lbs:  [] before manual  [] after manual    []  Ultrasound: []Continuous   [] Pulsed                           []1MHz   []3MHz W/cm2:  Location:    []  Iontophoresis with dexamethasone         Location: [] Take home patch   [] In clinic    []  Ice     []  heat  []  Ice massage  []  Laser   []  Anodyne Position:  Location:    []  Laser with stim  []  Other:  Position:  Location:    []  Vasopneumatic Device Pressure:       [] lo [] med [] hi   Temperature: [] lo [] med [] hi   [x] Skin assessment post-treatment:  [x]intact [x]redness- no adverse reaction    []redness  adverse reaction:        35 min Neuromuscular Re-education:  [x]  See flow sheet :facilitate neck and trunk mobility   Rationale: increase ROM, increase strength, improve coordination and increase proprioception  to improve the patients ability to ambulate throughout the community          With   [] TE   [] TA   [x] neuro   [] other: Patient Education: [x] Review HEP    [] Progressed/Changed HEP based on:   [x] positioning   [x] body mechanics   [x] transfers   [x] heat/ice application    [x] other: TENS     Other Objective/Functional Measures:     Pain Level (0-10 scale) post treatment: 4/10    ASSESSMENT/Changes in Function: Pt demonstrates improving mobility through trunk with good tolerance to gentle movement however pt reported increased pain with bridging and cervical isometrics. Pt tolerated more exercise today but manual work was avoided since she did not tolerate myofascial work at the last visit. Patient will continue to benefit from skilled PT services to address functional mobility deficits, address ROM deficits, address strength deficits, analyze and address soft tissue restrictions, analyze and cue movement patterns, analyze and modify body mechanics/ergonomics and assess and modify postural abnormalities to attain remaining goals. []  See Plan of Care  []  See progress note/recertification  []  See Discharge Summary         Progress towards goals / Updated goals:  Short Term Goals: To be accomplished in 2 weeks:  1. Patient will be independent and compliant with HEP to achieve other goals. Status at eval: issue on 2nd visit  Current:   2. Pt will report >/= 25% improvement in symptoms to increase activity/position tolerance. Status at eval: 0%  Current: Worsened after last treatment       Long Term Goals: To be accomplished in 4 weeks:  1.  Improve FOTO score to >/= 49/100 to indicate decreased pain with ADL's. Status at eval: 30/100  Current: Assess at 5th visit  2. Pt will report >/= 50% improvement in symptoms to increase activity/position tolerance. Status at eval: 0%  Current:   3. Pt will have 75% of normal lumbar AROM without increased pain to normalize ADL's.   Status at eval: ext: limited 75%, B rot: limited 50% with increased pain  Current: Improving    PLAN  []  Upgrade activities as tolerated     []  Continue plan of care  []  Update interventions per flow sheet       []  Discharge due to:_  []  Other:_      Aneudy Camarena PTA 4/13/2018  11:46 AM    Future Appointments  Date Time Provider Paul Elam   4/24/2018 3:30 PM Kilo CRISOSTOMO [facility]   4/26/2018 10:45 AM LORETTA Wray [facility]

## 2018-04-24 ENCOUNTER — HOSPITAL ENCOUNTER (OUTPATIENT)
Dept: PHYSICAL THERAPY | Age: 78
Discharge: HOME | End: 2018-04-24
Payer: MEDICARE

## 2018-04-24 PROCEDURE — 97112 NEUROMUSCULAR REEDUCATION: CPT

## 2018-04-24 PROCEDURE — 97014 ELECTRIC STIMULATION THERAPY: CPT

## 2018-04-24 NOTE — PROGRESS NOTES
PT DAILY TREATMENT NOTE - [facility]     Patient Name: Leopoldo Freeze  Date:2018  : 1940  [x]  Patient  Verified  Payor: [place] MEDICARE / Plan: [place] MEDICARE PART A & B / Product Type: Medicare /    In time:240  Out time:334  Total Treatment Time (min): 54  Total Timed Codes (min): 44  1:1 Treatment Time ( [address] only): 40   Visit #: 4 of 8    Treatment Area: Low back pain [M54.5]    SUBJECTIVE  Pain Level (0-10 scale): 6/10  Any medication changes, allergies to medications, adverse drug reactions, diagnosis change, or new procedure performed?: [x] No    [] Yes (see summary sheet for update)  Subjective functional status/changes:   [] No changes reported  Pt reports that she thinks she is worse when she comes to therapy and then after several days she feels better. She notes about 15-20% improvement.       OBJECTIVE    Modality rationale: decrease pain and increase tissue extensibility to improve the patients ability to complete ADls   Min Type Additional Details   10 [x] Estim:  [x]Unatt       [x]IFC  []Premod                        []Other:  []w/ice   [x]w/heat  Position:seated  Location:upper trap to thor back    [] Estim: []Att    []TENS instruct  []NMES                    []Other:  []w/US   []w/ice   []w/heat  Position:  Location:    []  Traction: [] Cervical       []Lumbar                       [] Prone          []Supine                       []Intermittent   []Continuous Lbs:  [] before manual  [] after manual    []  Ultrasound: []Continuous   [] Pulsed                           []1MHz   []3MHz W/cm2:  Location:    []  Iontophoresis with dexamethasone         Location: [] Take home patch   [] In clinic    []  Ice     []  heat  []  Ice massage  []  Laser   []  Anodyne Position:  Location:    []  Laser with stim  []  Other:  Position:  Location:    []  Vasopneumatic Device Pressure:       [] lo [] med [] hi   Temperature: [] lo [] med [] hi   [x] Skin assessment post-treatment:  [x]intact [x]redness- no adverse reaction    []redness  adverse reaction:      44 min Neuromuscular Re-education:  []  See flow sheet : facilitate trunk and neck mobility   Rationale: increase ROM, increase strength, improve coordination and increase proprioception  to improve the patients ability to complete ADls          With   [] TE   [] TA   [x] neuro   [] other: Patient Education: [x] Review HEP    [] Progressed/Changed HEP based on:   [] positioning   [x] body mechanics   [] transfers   [] heat/ice application    [] other:      Other Objective/Functional Measures:      Pain Level (0-10 scale) post treatment:4/10    ASSESSMENT/Changes in Function: Pt demonstrates mild improvement but has poor tolerance to many exercises, complaining of increased pain with many items so not  limiting progress. Patient will continue to benefit from skilled PT services to address functional mobility deficits, address ROM deficits, address strength deficits, analyze and address soft tissue restrictions, analyze and cue movement patterns and analyze and modify body mechanics/ergonomics to attain remaining goals. []  See Plan of Care  []  See progress note/recertification  []  See Discharge Summary         Progress towards goals / Updated goals:  Short Term Goals: To be accomplished in 2 weeks:  1. Patient will be independent and compliant with HEP to achieve other goals. Status at eval: issue on 2nd visit  Current:   2. Pt will report >/= 25% improvement in symptoms to increase activity/position tolerance. Status at eval: 0%  Current: Improved 15-20%      Long Term Goals: To be accomplished in 4 weeks:  1. Improve FOTO score to >/= 49/100 to indicate decreased pain with ADL's. Status at eval: 30/100  Current: Assess at 5th visit  2. Pt will report >/= 50% improvement in symptoms to increase activity/position tolerance. Status at eval: 0%  Current: Improved 15-20%   3.  Pt will have 75% of normal lumbar AROM without increased pain to normalize ADL's.  Status at eval: ext: limited 75%, B rot: limited 50% with increased pain  Current: Improving mildly  PLAN  []  Upgrade activities as tolerated     [x]  Continue plan of care  []  Update interventions per flow sheet       []  Discharge due to:_  []  Other:_      Aliya Silverman PTA 4/24/2018  12:49 PM    Future Appointments  Date Time Provider Paul Elam   4/24/2018 2:30 PM [facility]   4/26/2018 10:45 AM LORETTA Garrido [facility]   5/1/2018 9:00 AM [facility]   5/4/2018 10:30 AM PALMA Kohli [facility]   5/9/2018 4:30 PM LORETTA Garrido [facility]

## 2018-04-26 ENCOUNTER — HOSPITAL ENCOUNTER (OUTPATIENT)
Dept: PHYSICAL THERAPY | Age: 78
Discharge: HOME | End: 2018-04-26
Payer: MEDICARE

## 2018-04-26 PROCEDURE — 97014 ELECTRIC STIMULATION THERAPY: CPT

## 2018-04-26 PROCEDURE — 97112 NEUROMUSCULAR REEDUCATION: CPT

## 2018-04-26 NOTE — PROGRESS NOTES
PT DAILY TREATMENT NOTE     Patient Name: Marcelino Blanco  Date:2018  : 1940  [x]  Patient  Verified  Payor: [place] MEDICARE / Plan: [place] MEDICARE PART A & B / Product Type: Medicare /    In time:1045  Out time:1130  Total Treatment Time (min): 45  Timed Treatment:35  1:1 Treatment Time:35  Visit #: 5 of 8    Treatment Area: Low back pain [M54.5]    SUBJECTIVE  Pain Level (0-10 scale): 6/10  Any medication changes, allergies to medications, adverse drug reactions, diagnosis change, or new procedure performed?: [x] No    [] Yes (see summary sheet for update)  Subjective functional status/changes:   [] No changes reported  Pt reports that she woke up in pain but hasn't stretches or tried heat to manage her pain.     OBJECTIVE    Modality rationale: decrease pain and increase tissue extensibility to improve the patients ability to complete ADLs   Min Type Additional Details   10 [x] Estim:  [x]Unatt       [x]IFC  []Premod                        []Other:  []w/ice   [x]w/heat  Position:seatedLocation:    [] Estim: []Att    []TENS instruct  []NMES                    []Other:  []w/US   []w/ice   []w/heat  Position:  Location:    []  Traction: [] Cervical       []Lumbar                       [] Prone          []Supine                       []Intermittent   []Continuous Lbs:  [] before manual  [] after manual    []  Ultrasound: []Continuous   [] Pulsed                           []1MHz   []3MHz W/cm2:  Location:    []  Iontophoresis with dexamethasone         Location: [] Take home patch   [] In clinic    []  Ice     []  heat  []  Ice massage  []  Laser   []  Anodyne Position:  Location:    []  Laser with stim  []  Other:  Position:  Location:    []  Vasopneumatic Device Pressure:       [] lo [] med [] hi   Temperature: [] lo [] med [] hi   [x] Skin assessment post-treatment:  [x]intact [x]redness- no adverse reaction    []redness  adverse reaction:        35 min Neuromuscular Re-education:  []  See flow sheet :   Rationale: increase ROM, increase strength, improve coordination and increase proprioception  to improve the patients ability to complete ADLs    With   [] TE   [] TA   [x] neuro   [] other: Patient Education: [x] Review HEP    [] Progressed/Changed HEP based on:   [] positioning   [] body mechanics   [] transfers   [x] heat/ice application    [x] other: HEP provided     Other Objective/Functional Measures:      Pain Level (0-10 scale) post treatment: 3/10    ASSESSMENT/Changes in Function: Pt tolerated a few more repetitions with reduced complaints today. Additionally she reported more pain in her low back than neck which is also an improvement. Pt provided with trunk stretches to promote improved mobility. Patient will continue to benefit from skilled PT services to address functional mobility deficits, address ROM deficits, address strength deficits, analyze and address soft tissue restrictions, analyze and cue movement patterns, analyze and modify body mechanics/ergonomics and assess and modify postural abnormalities to attain remaining goals. []  See Plan of Care  []  See progress note/recertification  []  See Discharge Summary         Progress towards goals / Updated goals:  Short Term Goals: To be accomplished in 2 weeks:  1. Patient will be independent and compliant with HEP to achieve other goals. Status at eval: issue on 2nd visit  Current:   2. Pt will report >/= 25% improvement in symptoms to increase activity/position tolerance. Status at eval: 0%  Current: Improved 15-20%      Long Term Goals: To be accomplished in 4 weeks:  1. Improve FOTO score to >/= 49/100 to indicate decreased pain with ADL's. Status at eval: 30/100  Current: Progressin/100  2. Pt will report >/= 50% improvement in symptoms to increase activity/position tolerance. Status at eval: 0%  Current: Improved 15-20%   3. Pt will have 75% of normal lumbar AROM without increased pain to normalize ADL's.   Status at eval: ext: limited 75%, B rot: limited 50% with increased pain  Current: Improving mildly    PLAN  []  Upgrade activities as tolerated     [x]  Continue plan of care  []  Update interventions per flow sheet       []  Discharge due to:_  []  Other:_      Kristopher Saldivar PTA 4/26/2018  8:14 AM    Future Appointments  Date Time Provider Paul Elam   4/26/2018 10:45 AM LORETTA Stanford [facility]   5/1/2018 9:00 AM LORETTA Stanford [facility]   5/4/2018 10:30 AM PALMA Daily [facility]   5/9/2018 4:30 PM LORETTA Stanford [facility]

## 2018-05-01 ENCOUNTER — HOSPITAL ENCOUNTER (OUTPATIENT)
Dept: PHYSICAL THERAPY | Age: 78
Discharge: HOME | End: 2018-05-01
Payer: MEDICARE

## 2018-05-01 PROCEDURE — 97112 NEUROMUSCULAR REEDUCATION: CPT

## 2018-05-01 NOTE — PROGRESS NOTES
PT DAILY TREATMENT NOTE - [facility]     Patient Name: Maren Vee  Date:2018  : 1940  [x]  Patient  Verified  Payor: [place] MEDICARE / Plan: [place] MEDICARE PART A & B / Product Type: Medicare /    In time:903  Out time:1008  Total Treatment Time (min): 65  Total Timed Codes (min): 55  1:1 Treatment Time ( only): 54   Visit #: 6 of 8    Treatment Area: Low back pain [M54.5]  SUBJECTIVE  Pain Level (0-10 scale): 3/10  Any medication changes, allergies to medications, adverse drug reactions, diagnosis change, or new procedure performed?: [x] No    [] Yes (see summary sheet for update)  Subjective functional status/changes:   [] No changes reported  Pt reports that some days she feels better and some days not so much.   She states that her highest pain recently was 6-7/10    OBJECTIVE    Modality rationale: decrease pain and increase tissue extensibility to improve the patients ability to complete ADls   Min Type Additional Details   10 [x] Estim:  [x]Unatt       [x]IFC  []Premod                        []Other:  []w/ice   [x]w/heat  Position:seated  Location:low back    [] Estim: []Att    []TENS instruct  []NMES                    []Other:  []w/US   []w/ice   []w/heat  Position:  Location:    []  Traction: [] Cervical       []Lumbar                       [] Prone          []Supine                       []Intermittent   []Continuous Lbs:  [] before manual  [] after manual    []  Ultrasound: []Continuous   [] Pulsed                           []1MHz   []3MHz W/cm2:  Location:    []  Iontophoresis with dexamethasone         Location: [] Take home patch   [] In clinic    []  Ice     []  heat  []  Ice massage  []  Laser   []  Anodyne Position:  Location:    []  Laser with stim  []  Other:  Position:  Location:    []  Vasopneumatic Device Pressure:       [] lo [] med [] hi   Temperature: [] lo [] med [] hi   [x] Skin assessment post-treatment:  [x]intact [x]redness- no adverse reaction    []redness  adverse reaction:      55 min Neuromuscular Re-education:  [x]  See flow sheet :   Rationale: increase ROM, increase strength, improve coordination, improve balance and increase proprioception  to improve the patients ability to complete ADLs          With   [] TE   [] TA   [x] neuro   [] other: Patient Education: [x] Review HEP    [] Progressed/Changed HEP based on:   [x] positioning   [x] body mechanics   [] transfers   [] heat/ice application    [] other:      Other Objective/Functional Measures:      Pain Level (0-10 scale) post treatment: 2/10    ASSESSMENT/Changes in Function: Pt demonstrates improving tolerance to AROM with neck and back. Pt continues to have flares of pain 6-7/10 however is improving slowly. Patient will continue to benefit from skilled PT services to address functional mobility deficits, address ROM deficits, address strength deficits and analyze and address soft tissue restrictions to attain remaining goals. []  See Plan of Care  []  See progress note/recertification  []  See Discharge Summary         Progress towards goals / Updated goals:  Short Term Goals: To be accomplished in 2 weeks:  1. Patient will be independent and compliant with HEP to achieve other goals. Status at eval: issue on 2nd visit  Current: Progressing  2. Pt will report >/= 25% improvement in symptoms to increase activity/position tolerance. Status at eval: 0%  Current: Improved 15-20%      Long Term Goals: To be accomplished in 4 weeks:  1. Improve FOTO score to >/= 49/100 to indicate decreased pain with ADL's. Status at eval: 30/100  Current: Progressin/100  2. Pt will report >/= 50% improvement in symptoms to increase activity/position tolerance. Status at eval: 0%  Current: Improved 15-20%   3. Pt will have 75% of normal lumbar AROM without increased pain to normalize ADL's. Status at eval: ext: limited 75%, B rot: limited 50% with increased pain  Current:  Moderate improvement noted       PLAN  [x] Upgrade activities as tolerated     [x]  Continue plan of care  []  Update interventions per flow sheet       []  Discharge due to:_  []  Other:_      Shiela Guido PTA 5/1/2018  9:05 AM    Future Appointments  Date Time Provider Paul Elam   5/4/2018 10:30 AM Maycol Riley [facility]   5/9/2018 4:30 PM Andre Kenney PTA [facility]

## 2018-05-04 ENCOUNTER — HOSPITAL ENCOUNTER (OUTPATIENT)
Dept: PHYSICAL THERAPY | Age: 78
Discharge: HOME | End: 2018-05-04
Payer: MEDICARE

## 2018-05-04 PROCEDURE — G8979 MOBILITY GOAL STATUS: HCPCS

## 2018-05-04 PROCEDURE — 97110 THERAPEUTIC EXERCISES: CPT

## 2018-05-04 PROCEDURE — 97014 ELECTRIC STIMULATION THERAPY: CPT

## 2018-05-04 PROCEDURE — G8978 MOBILITY CURRENT STATUS: HCPCS

## 2018-05-04 NOTE — PROGRESS NOTES
[facility], [address] [place] [address]  Phone: [phone]   Fax: [phone]    Medicare Progress Report      Patient name: Wanda Washburn     Start of Care: 18  Referral source: Tate Perry MD    : 1940  Medical/Treatment Diagnosis: Low back pain [M54.5]  Onset Date:2018  Prior Hospitalization: see medical history   Provider#: [id number]  Comorbidities: thyroid problems, arthritis, hearing impaired  Prior Level of Function:intermittent, manageable back pain with ADL's  Medications: Verified on Patient Summary List    Visits from Start of Care: 7    Missed Visits: 0  Reporting Period : 18 to 18    Subjective Reports: The original problem is much better, but it's shifted to the left side. Short Term Goals: To be accomplished in 2 weeks:  1. Patient will be independent and compliant with HEP to achieve other goals. Status at eval: issue on 2nd visit  Current: pt reports compliance  2. Pt will report >/= 25% improvement in symptoms to increase activity/position tolerance. Status at eval: 0%  Current: Improved 15-20%      Long Term Goals: To be accomplished in 4 weeks:  1. Improve FOTO score to >/= 49/100 to indicate decreased pain with ADL's. Status at eval: 30/100  Current: Progressin/100  2. Pt will report >/= 50% improvement in symptoms to increase activity/position tolerance. Status at eval: 0%  Current: Improved 15-20%   3. Pt will have 75% of normal lumbar AROM without increased pain to normalize ADL's. Status at eval: ext: limited 75%, B rot: limited 50% with increased pain  Current: progressing: B rot: limited 25%, ext: limited 50%      Problems/ barriers to goal attainment: poor tolerance to many exercises    Assessment / Recommendations:Pain level slowly decreasing with lumbar ROM improving.  Pt would benefit additional, skilled PT intervention to continue to address pain and ROM deficits to thereby normalize function. Problem List: pain affecting function, decrease ROM, decrease strength, decrease ADL/ functional abilitiies, decrease activity tolerance and decrease flexibility/ joint mobility   Treatment Plan: Therapeutic exercise, Therapeutic activities, Neuromuscular re-education, Physical agent/modality, Manual therapy, Aquatic therapy and Patient education    Patient Goal (s) has been updated and includes: decrease pain     Updated Goals to be accomplished in 4 weeks:  Continue with unmet goals above. Frequency / Duration: Patient to be seen 2 times per week for 4 weeks:    G-Codes (GP)  Mobility  [id number] Current  CK= 40-59%   Goal  CK= 40-59%    The severity rating is based on clinical judgement and the FOTO score.       Erika Jacobo, PT 5/4/2018 12:14 PM

## 2018-05-07 ENCOUNTER — HOSPITAL ENCOUNTER (OUTPATIENT)
Dept: PHYSICAL THERAPY | Age: 78
Discharge: HOME | End: 2018-05-07
Payer: MEDICARE

## 2018-05-07 ENCOUNTER — APPOINTMENT (OUTPATIENT)
Dept: PHYSICAL THERAPY | Age: 78
End: 2018-05-07
Payer: MEDICARE

## 2018-05-07 PROCEDURE — 97014 ELECTRIC STIMULATION THERAPY: CPT

## 2018-05-07 PROCEDURE — 97112 NEUROMUSCULAR REEDUCATION: CPT

## 2018-05-09 ENCOUNTER — HOSPITAL ENCOUNTER (OUTPATIENT)
Dept: PHYSICAL THERAPY | Age: 78
Discharge: HOME | End: 2018-05-09
Payer: MEDICARE

## 2018-05-09 PROCEDURE — 97110 THERAPEUTIC EXERCISES: CPT

## 2018-05-09 PROCEDURE — 97530 THERAPEUTIC ACTIVITIES: CPT

## 2018-05-09 PROCEDURE — 97014 ELECTRIC STIMULATION THERAPY: CPT

## 2018-05-09 NOTE — PROGRESS NOTES
PT DAILY TREATMENT NOTE - [facility]     Patient Name: Maren Vee  Date:2018  : 1940  [x]  Patient  Verified  Payor: [place] MEDICARE / Plan: VA MEDICARE PART A & B / Product Type: Medicare /    In time:1430  Out time:1520  Total Treatment Time (min): 50  Total Timed Codes (min): 38  1:1 Treatment Time ( only): 38   Visit #: 9 of 15    Treatment Area: Low back pain [M54.5]    SUBJECTIVE  Pain Level (0-10 scale): 3/10  Any medication changes, allergies to medications, adverse drug reactions, diagnosis change, or new procedure performed?: [x] No    [] Yes (see summary sheet for update)  Subjective functional status/changes:   [] No changes reported  I usually feel worse with the exercises but I am a little better overall.     OBJECTIVE    Modality rationale: decrease pain and increase tissue extensibility to improve the patients ability to perform ADL's with decreased pain and sx   Min Type Additional Details   12 [x] Estim:  [x]Unatt       [x]IFC  []Premod                        []Other:  []w/ice   [x]w/heat  Position:seated  Location:neck and lower back    [] Estim: []Att    []TENS instruct  []NMES                    []Other:  []w/US   []w/ice   []w/heat  Position:  Location:    []  Traction: [] Cervical       []Lumbar                       [] Prone          []Supine                       []Intermittent   []Continuous Lbs:  [] before manual  [] after manual    []  Ultrasound: []Continuous   [] Pulsed                           []1MHz   []3MHz W/cm2:  Location:    []  Iontophoresis with dexamethasone         Location: [] Take home patch   [] In clinic    []  Ice     []  heat  []  Ice massage  []  Laser   []  Anodyne Position:  Location:    []  Laser with stim  []  Other:  Position:  Location:    []  Vasopneumatic Device Pressure:       [] lo [] med [] hi   Temperature: [] lo [] med [] hi   [] Skin assessment post-treatment:  []intact []redness- no adverse reaction    []redness  adverse reaction: min []Eval                  []Re-Eval       28 min Therapeutic Exercise:  [] See flow sheet :   Rationale: increase ROM and increase strength to improve the patients ability to normalize ADL's       10 min Therapeutic Activity:  []  See flow sheet :   Rationale: increase ROM and increase strength and postural awarenessto improve the patients ability to normalize ADL's                  With   [] TE   [] TA   [] neuro   [] other: Patient Education: [x] Review HEP    [] Progressed/Changed HEP based on:   [] positioning   [] body mechanics   [] transfers   [] heat/ice application    [] other:      Other Objective/Functional Measures:      Pain Level (0-10 scale) post treatment: 3/10    ASSESSMENT/Changes in Function: No new changes. Patient will continue to benefit from skilled PT services to modify and progress therapeutic interventions, address ROM deficits, address strength deficits, analyze and address soft tissue restrictions, analyze and cue movement patterns, analyze and modify body mechanics/ergonomics, assess and modify postural abnormalities and instruct in home and community integration to attain remaining goals. []  See Plan of Care  [x]  See progress note/recertification  []  See Discharge Summary         Progress towards goals / Updated goals:  Short Term Goals: To be accomplished in 2 weeks:  1. Patient will be independent and compliant with HEP to achieve other goals. Status at eval: issue on 2nd visit  Status at PN: pt reports compliance  Current:   2. Pt will report >/= 25% improvement in symptoms to increase activity/position tolerance. Status at eval: 0%  Status at PN: Improved 15-20%      Long Term Goals: To be accomplished in 4 weeks:  1. Improve FOTO score to >/= 49/100 to indicate decreased pain with ADL's. Status at eval: 30/100  Status at PN: Progressin/100  Current: retest at visit 10  2.  Pt will report >/= 50% improvement in symptoms to increase activity/position tolerance. Status at eval: 0%  Status at  PN: Improved 15-20%   Current; ADL's improving  3. Pt will have 75% of normal lumbar AROM without increased pain to normalize ADL's.   Status at eval: ext: limited 75%, B rot: limited 50% with increased pain  Status at  PN: progressing: B rot: limited 25%, ext: limited 50%  Current:     PLAN  [x]  Upgrade activities as tolerated     [x]  Continue plan of care  []  Update interventions per flow sheet       []  Discharge due to:_  []  Other:_      Albert Woodard PTA 5/9/2018  2:21 PM    Future Appointments  Date Time Provider Paul Elam   5/9/2018 2:30 PM LORETTA Cordova[facility]RE[facility]   5/14/2018 11:45 AM Parminderromario Ayala PTA [facility]   5/17/2018 1:45 PM Ravencliffromario Ayala PTA [facility]   5/22/2018 2:00 PM Albert Woodard PTA [facility]   5/24/2018 2:30 PM Parminder Abbot, PTA [facility]   5/29/2018 3:00 PM Albert Woodard PTA [facility]   6/1/2018 1:30 PM Isai Rincon, PT [facility]

## 2018-05-14 ENCOUNTER — HOSPITAL ENCOUNTER (OUTPATIENT)
Dept: PHYSICAL THERAPY | Age: 78
Discharge: HOME | End: 2018-05-14
Payer: MEDICARE

## 2018-05-14 PROCEDURE — 97014 ELECTRIC STIMULATION THERAPY: CPT

## 2018-05-14 PROCEDURE — 97112 NEUROMUSCULAR REEDUCATION: CPT

## 2018-05-14 NOTE — PROGRESS NOTES
PT DAILY TREATMENT NOTE - [facility]     Patient Name: Corinne Leys  Date:2018  : 1940  [x]  Patient  Verified  Payor: [place] MEDICARE / Plan: [place] MEDICARE PART A & B / Product Type: Medicare /    In time:1150  Out time:1247  Total Treatment Time (min): 57  Total Timed Codes (min): 45  1:1 Treatment Time ( [address] only): 33  Visit #: 10 of 15    Treatment Area: Low back pain [M54.5]    SUBJECTIVE  Pain Level (0-10 scale): 0/10  Any medication changes, allergies to medications, adverse drug reactions, diagnosis change, or new procedure performed?: [x] No    [] Yes (see summary sheet for update)  Subjective functional status/changes:   [] No changes reported  Pt reports that her sciatica has been bothering her over the weekend. She that she used aleve for it but not heat.     OBJECTIVE    Modality rationale: decrease pain and increase tissue extensibility to improve the patients ability to complete ADLs   Min Type Additional Details   12 [x] Estim:  [x]Unatt       [x]IFC  []Premod                        []Other:  []w/ice   [x]w/heat  Position:hooklyingLocation:low back    [] Estim: []Att    []TENS instruct  []NMES                    []Other:  []w/US   []w/ice   []w/heat  Position:  Location:    []  Traction: [] Cervical       []Lumbar                       [] Prone          []Supine                       []Intermittent   []Continuous Lbs:  [] before manual  [] after manual    []  Ultrasound: []Continuous   [] Pulsed                           []1MHz   []3MHz W/cm2:  Location:    []  Iontophoresis with dexamethasone         Location: [] Take home patch   [] In clinic    []  Ice     []  heat  []  Ice massage  []  Laser   []  Anodyne Position:  Location:    []  Laser with stim  []  Other:  Position:  Location:    []  Vasopneumatic Device Pressure:       [] lo [] med [] hi   Temperature: [] lo [] med [] hi   [x] Skin assessment post-treatment:  [x]intact [x]redness- no adverse reaction       45  1:1  33 min   min Neuromuscular Re-education:  [x]  See flow sheet :facilitate trunk stabilizers to promote improve function   Rationale: increase ROM, increase strength, improve coordination and increase proprioception  to improve the patients ability to complete ADLs          With   [] TE   [] TA   [x] neuro   [] other: Patient Education: [x] Review HEP    [] Progressed/Changed HEP based on:   [x] positioning   [x] body mechanics   [] transfers   [x] heat/ice application    [] other:      Other Objective/Functional Measures:     Pain Level (0-10 scale) post treatment: 2/10    ASSESSMENT/Changes in Function: Pt demonstrates continued AROM restrictions with neck and back with recent flare of referred pain wrapping from left buttock to thigh. Pt did report reduced pain following therex and estim. Pt encouraged to maintain mobility between visits. Patient will continue to benefit from skilled PT services to address functional mobility deficits, address ROM deficits, address strength deficits, analyze and address soft tissue restrictions, analyze and cue movement patterns, analyze and modify body mechanics/ergonomics and assess and modify postural abnormalities to attain remaining goals. []  See Plan of Care  []  See progress note/recertification  []  See Discharge Summary         Progress towards goals / Updated goals:  Short Term Goals: To be accomplished in 2 weeks:  1. Patient will be independent and compliant with HEP to achieve other goals. Status at eval: issue on 2nd visit  Status at PN: pt reports compliance  Current:   2. Pt will report >/= 25% improvement in symptoms to increase activity/position tolerance. Status at eval: 0%  Status at PN: Improved 15-20%      Long Term Goals: To be accomplished in 4 weeks:  1. Improve FOTO score to >/= 49/100 to indicate decreased pain with ADL's. Status at eval: 30/100  Status at PN: Progressin/100  Current: Slight improvement 43/100  2.  Pt will report >/= 50% improvement in symptoms to increase activity/position tolerance. Status at eval: 0%  Status at  PN: Improved 15-20%   Current; ADL's improving  3. Pt will have 75% of normal lumbar AROM without increased pain to normalize ADL's.   Status at eval: ext: limited 75%, B rot: limited 50% with increased pain  Status at  PN: progressing: B rot: limited 25%, ext: limited 50%  Current:     PLAN  [x]  Upgrade activities as tolerated     [x]  Continue plan of care  []  Update interventions per flow sheet       []  Discharge due to:_  []  Other:_      Estrellita Madison PTA 5/14/2018  11:44 AM    Future Appointments  Date Time Provider Paul Elam   5/14/2018 11:45 AM LORETTA Kruger THE [facility]   5/17/2018 1:45 PM LORETTA KrugerHPTABDULKADIR THE [facility]   5/22/2018 2:00 PM LORETTA AcunaHPTABDULKADIR THE [facility]   5/24/2018 2:30 PM LORETTA KrugerHPTABDULKADIR THE [facility]   5/29/2018 3:00 PM LORETTA AcunaHPTABDULKADIR THE [facility]   6/1/2018 1:30 PM PALMA Slaughter THE [facility]

## 2018-05-17 ENCOUNTER — HOSPITAL ENCOUNTER (OUTPATIENT)
Dept: PHYSICAL THERAPY | Age: 78
Discharge: HOME | End: 2018-05-17
Payer: MEDICARE

## 2018-05-17 PROCEDURE — 97014 ELECTRIC STIMULATION THERAPY: CPT

## 2018-05-17 PROCEDURE — 97112 NEUROMUSCULAR REEDUCATION: CPT

## 2018-05-17 NOTE — PROGRESS NOTES
PT DAILY TREATMENT NOTE - [facility]     Patient Name: Jake Ibrahim  Date:2018  : 1940  [x]  Patient  Verified  Payor: Milka Girish / Plan: [place] MEDICARE PART A & B / Product Type: Medicare /    In time:152  Out time:255  Total Treatment Time (min): 63  Total Timed Codes (min): 53  1:1 Treatment Time ( [address] only):23  Visit #: 11 of 15    Treatment Area: Low back pain [M54.5]    SUBJECTIVE  Pain Level (0-10 scale): 2-310  Any medication changes, allergies to medications, adverse drug reactions, diagnosis change, or new procedure performed?: [x] No    [] Yes (see summary sheet for update)  Subjective functional status/changes:   [] No changes reported  Pt reports that she is feeling so much better this week and feels like she is turning the corner. She states that she is doing more housework and gardening. She reports 50% improvement overall.     OBJECTIVE    Modality rationale: decrease pain and increase tissue extensibility to improve the patients ability to ambulate community distances   Min Type Additional Details   10 [x] Estim:  [x]Unatt       [x]IFC  []Premod                        []Other:  []w/ice   [x]w/heat  Position:seated  Location:neck and low back    [] Estim: []Att    []TENS instruct  []NMES                    []Other:  []w/US   []w/ice   []w/heat  Position:  Location:    []  Traction: [] Cervical       []Lumbar                       [] Prone          []Supine                       []Intermittent   []Continuous Lbs:  [] before manual  [] after manual    []  Ultrasound: []Continuous   [] Pulsed                           []1MHz   []3MHz W/cm2:  Location:    []  Iontophoresis with dexamethasone         Location: [] Take home patch   [] In clinic    []  Ice     []  heat  []  Ice massage  []  Laser   []  Anodyne Position:  Location:    []  Laser with stim  []  Other:  Position:  Location:    []  Vasopneumatic Device Pressure:       [] lo [] med [] hi   Temperature: [] lo [] med [] hi   [x] Skin assessment post-treatment:  [x]intact [x]redness- no adverse reaction     53 1:1 23 min min Neuromuscular Re-education:  [x]  See flow sheet :facilitate core and shoulder stabilizers   Rationale: increase ROM, increase strength, improve coordination and increase proprioception  to improve the patients ability to ambulate community distances          With   [] TE   [] TA   [] neuro   [] other: Patient Education: [x] Review HEP    [] Progressed/Changed HEP based on:   [] positioning   [] body mechanics   [] transfers   [] heat/ice application    [] other:          Pain Level (0-10 scale) post treatment:5/10 hip 0/10 neck    ASSESSMENT/Changes in Function: Pt demonstrates improving mobility in trunk with noted improvement with function at home. Patient will continue to benefit from skilled PT services to address functional mobility deficits, address ROM deficits, address strength deficits, analyze and address soft tissue restrictions, analyze and cue movement patterns, analyze and modify body mechanics/ergonomics and assess and modify postural abnormalities to attain remaining goals. []  See Plan of Care  []  See progress note/recertification  []  See Discharge Summary         Progress towards goals / Updated goals:  Short Term Goals: To be accomplished in 2 weeks:  1. Patient will be independent and compliant with HEP to achieve other goals. Status at eval: issue on 2nd visit  Status at PN: pt reports compliance  Current:   2. Pt will report >/= 25% improvement in symptoms to increase activity/position tolerance. Status at eval: 0%  Status at PN: Improved 15-20%  Current:50% improvement, MET      Long Term Goals: To be accomplished in 4 weeks:  1. Improve FOTO score to >/= 49/100 to indicate decreased pain with ADL's. Status at eval: 30/100  Status at PN: Progressin/100  Current: Slight improvement 43/100  2.  Pt will report >/= 50% improvement in symptoms to increase activity/position tolerance. Status at eval: 0%  Status at [facility]: Improved 15-20%   Current;MET: 50% improvement  3. Pt will have 75% of normal lumbar AROM without increased pain to normalize ADL's.   Status at eval: ext: limited 75%, B rot: limited 50% with increased pain  Status at  : progressing: B rot: limited 25%, ext: limited 50%  Current: Improving    PLAN  [x]  Upgrade activities as tolerated     [x]  Continue plan of care  []  Update interventions per flow sheet       []  Discharge due to:_  []  Other:_      Shorty Sousa PTA 5/17/2018  1:13 PM    Future Appointments  Date Time Provider Paul Elam   5/17/2018 1:45 PM LORETTA Rod THE [facility]   5/22/2018 2:00 PM LORETTA Guillaume THE [facility]   5/24/2018 2:30 PM LORETTA Rod THE [facility]   5/29/2018 3:00 PM LORETTA GuillaumeHPTABDULKADIR THE [facility]   6/1/2018 1:30 PM PALMA DaviesHPTABDULKADIR THE [facility]

## 2018-05-22 ENCOUNTER — HOSPITAL ENCOUNTER (OUTPATIENT)
Dept: PHYSICAL THERAPY | Age: 78
Discharge: HOME | End: 2018-05-22
Payer: MEDICARE

## 2018-05-22 PROCEDURE — 97112 NEUROMUSCULAR REEDUCATION: CPT

## 2018-05-22 PROCEDURE — 97110 THERAPEUTIC EXERCISES: CPT

## 2018-05-22 PROCEDURE — 97014 ELECTRIC STIMULATION THERAPY: CPT

## 2018-05-22 NOTE — PROGRESS NOTES
PT DAILY TREATMENT NOTE - [facility]     Patient Name: Ruben Blankenship  Date:2018  : 1940  [x]  Patient  Verified  Payor: [place] MEDICARE / Plan: VA MEDICARE PART A & B / Product Type: Medicare /    In time:1410  Out time:1505  Total Treatment Time (min): 55  Total Timed Codes (min): 40  1:1 Treatment Time ( only): 40   Visit #: 12 of 15    Treatment Area: Low back pain [M54.5]    SUBJECTIVE  Pain Level (0-10 scale): 4/10  Any medication changes, allergies to medications, adverse drug reactions, diagnosis change, or new procedure performed?: [x] No    [] Yes (see summary sheet for update)  Subjective functional status/changes:   [x] No changes reported      OBJECTIVE    Modality rationale: decrease pain and increase tissue extensibility to improve the patients ability to perform ADL's with decreased pain and sx   Min Type Additional Details   15 [x] Estim:  [x]Unatt       [x]IFC  []Premod                        []Other:  []w/ice   [x]w/heat  Position:seated  Location:C/S and L/S    [] Estim: []Att    []TENS instruct  []NMES                    []Other:  []w/US   []w/ice   []w/heat  Position:  Location:    []  Traction: [] Cervical       []Lumbar                       [] Prone          []Supine                       []Intermittent   []Continuous Lbs:  [] before manual  [] after manual    []  Ultrasound: []Continuous   [] Pulsed                           []1MHz   []3MHz W/cm2:  Location:    []  Iontophoresis with dexamethasone         Location: [] Take home patch   [] In clinic    []  Ice     []  heat  []  Ice massage  []  Laser   []  Anodyne Position:  Location:    []  Laser with stim  []  Other:  Position:  Location:    []  Vasopneumatic Device Pressure:       [] lo [] med [] hi   Temperature: [] lo [] med [] hi   [] Skin assessment post-treatment:  []intact []redness- no adverse reaction    []redness  adverse reaction:      min []Eval                  []Re-Eval       28 min Therapeutic Exercise:  [] See flow sheet :   Rationale: increase ROM, increase strength and improve coordination to improve the patients ability to perform ADL's with decreased pain and sx       12 min Neuromuscular Re-education:  [x]  See flow sheet :   Rationale: increase ROM, increase strength and increase proprioception  to improve the patients ability to perform ADL's with decreased pain and sx              With   [] TE   [] TA   [] neuro   [] other: Patient Education: [x] Review HEP    [] Progressed/Changed HEP based on:   [] positioning   [] body mechanics   [] transfers   [] heat/ice application    [] other:      Other Objective/Functional Measures: Issued trial Y pass to promote aquatics and self monitored exercise routine to include chair yoga     Pain Level (0-10 scale) post treatment: 2/10    ASSESSMENT/Changes in Function: Pt reports improvements in mobility following therex with return to gym recommended. Patient will continue to benefit from skilled PT services to modify and progress therapeutic interventions, address ROM deficits, address strength deficits, analyze and address soft tissue restrictions, analyze and cue movement patterns, analyze and modify body mechanics/ergonomics and instruct in home and community integration to attain remaining goals. []  See Plan of Care  [x]  See progress note/recertification  []  See Discharge Summary         Progress towards goals / Updated goals:  Short Term Goals: To be accomplished in 2 weeks:  1. Patient will be independent and compliant with HEP to achieve other goals. Status at eval: issue on 2nd visit  Status at PN: pt reports compliance  Current: self monitored exercise in gym recommended with y pass issued  2. Pt will report >/= 25% improvement in symptoms to increase activity/position tolerance. Status at eval: 0%  Status at PN: Improved 15-20%  Current:50% improvement, MET      Long Term Goals: To be accomplished in 4 weeks:  1.  Improve FOTO score to >/= 49/100 to indicate decreased pain with ADL's. Status at eval: 30/100  Status at PN: Progressin/100  Current: Slight improvement 43/100  2. Pt will report >/= 50% improvement in symptoms to increase activity/position tolerance. Status at eval: 0%  Status at [facility]: Improved 15-20%   Current;MET: 50% improvement  3. Pt will have 75% of normal lumbar AROM without increased pain to normalize ADL's.   Status at eval: ext: limited 75%, B rot: limited 50% with increased pain  Status at  PN: progressing: B rot: limited 25%, ext: limited 50%  Current: Improving       PLAN  [x]  Upgrade activities as tolerated     [x]  Continue plan of care  []  Update interventions per flow sheet       []  Discharge due to:_  []  Other:_      Selam Dhillon PTA 2018  2:44 PM    Future Appointments  Date Time Provider Paul Elam   2018 2:30 PM [place], [place] KRANTHI THE [facility]   2018 3:00 PM LORETTA Zavala [facility]   2018 1:30 PM PALMA Altamirano THE [facility]

## 2018-05-24 ENCOUNTER — HOSPITAL ENCOUNTER (OUTPATIENT)
Dept: PHYSICAL THERAPY | Age: 78
Discharge: HOME | End: 2018-05-24
Payer: MEDICARE

## 2018-05-24 PROCEDURE — 97140 MANUAL THERAPY 1/> REGIONS: CPT

## 2018-05-24 PROCEDURE — 97014 ELECTRIC STIMULATION THERAPY: CPT

## 2018-05-24 PROCEDURE — 97110 THERAPEUTIC EXERCISES: CPT

## 2018-05-29 ENCOUNTER — APPOINTMENT (OUTPATIENT)
Dept: PHYSICAL THERAPY | Age: 78
End: 2018-05-29
Payer: MEDICARE

## 2018-06-01 ENCOUNTER — HOSPITAL ENCOUNTER (OUTPATIENT)
Dept: PHYSICAL THERAPY | Age: 78
Discharge: HOME | End: 2018-06-01
Payer: MEDICARE

## 2018-06-01 PROCEDURE — G8979 MOBILITY GOAL STATUS: HCPCS

## 2018-06-01 PROCEDURE — 97014 ELECTRIC STIMULATION THERAPY: CPT

## 2018-06-01 PROCEDURE — G8980 MOBILITY D/C STATUS: HCPCS

## 2018-06-01 PROCEDURE — 97110 THERAPEUTIC EXERCISES: CPT

## 2018-06-01 NOTE — PROGRESS NOTES
PT DAILY TREATMENT NOTE - [facility]     Patient Name: Amor Theodore  Date:2018  : 1940  [x]  Patient  Verified  Payor: [place] MEDICARE / Plan: VA MEDICARE PART A & B / Product Type: Medicare /    In time:130  Out time:212  Total Treatment Time (min): 42  Total Timed Codes (min): 23  1:1 Treatment Time ( only): 23   Visit #: 14 of 15    Treatment Area: Low back pain [M54.5]    SUBJECTIVE  Pain Level (0-10 scale): 3/10 right, 6/10 left hip region  Any medication changes, allergies to medications, adverse drug reactions, diagnosis change, or new procedure performed?: [x] No    [] Yes (see summary sheet for update)  Subjective functional status/changes:   [] No changes reported  Saw the ortho surgeon and he said to stop PT because I have bone on bone in my hip and knee. He said I could do water aerobics/swimming.     OBJECTIVE    Modality rationale: decrease pain and increase tissue extensibility to improve the patients ability to increase activity/position tolerance   Min Type Additional Details   15 with 4 min setup [x] Estim:  [x]Unatt       [x]IFC  []Premod                        []Other:  []w/ice   []w/heat  Position:  Location:    [] Estim: []Att    []TENS instruct  []NMES                    []Other:  []w/US   []w/ice   []w/heat  Position:  Location:    []  Traction: [] Cervical       []Lumbar                       [] Prone          []Supine                       []Intermittent   []Continuous Lbs:  [] before manual  [] after manual    []  Ultrasound: []Continuous   [] Pulsed                           []1MHz   []3MHz W/cm2:  Location:    []  Iontophoresis with dexamethasone         Location: [] Take home patch   [] In clinic    []  Ice     []  heat  []  Ice massage  []  Laser   []  Anodyne Position:  Location:    []  Laser with stim  []  Other:  Position:  Location:    []  Vasopneumatic Device Pressure:       [] lo [] med [] hi   Temperature: [] lo [] med [] hi   [] Skin assessment post-treatment: []intact []redness- no adverse reaction    []redness - adverse reaction:      min []Eval                  []Re-Eval       23 min Therapeutic Exercise:  [x] See flow sheet :   Rationale: increase ROM and increase strength to improve the patients ability to normalize ADL\"s     min Therapeutic Activity:  []  See flow sheet :         min Neuromuscular Re-education:  []  See flow sheet :        min Manual Therapy:          min Gait Training:  ___ feet with ___ device on level surfaces with ___ level of assist   Rationale: With   [] TE   [] TA   [] neuro   [] other: Patient Education: [x] Review HEP    [] Progressed/Changed HEP based on:   [] positioning   [] body mechanics   [] transfers   [] heat/ice application    [] other:      Other Objective/Functional Measures: see goal review for FOTO     Pain Level (0-10 scale) post treatment: 0/10 right, 4/10 left    ASSESSMENT/Changes in Function: No additional improvement in FOTO score from previous assess. []  See Plan of Care  []  See progress note/recertification  [x]  See Discharge Summary         Progress towards goals / Updated goals:  Short Term Goals: To be accomplished in 2 weeks:  1. Patient will be independent and compliant with HEP to achieve other goals. Status at eval: issue on 2nd visit  Status at PN: pt reports compliance  Current: self monitored exercise in gym recommended with y pass issued  2. Pt will report >/= 25% improvement in symptoms to increase activity/position tolerance. Status at eval: 0%  Status at PN: Improved 15-20%  Current:50% improvement, MET      Long Term Goals: To be accomplished in 4 weeks:  1. Improve FOTO score to >/= 49/100 to indicate decreased pain with ADL's. Status at eval: 30/100  Status at PN: Progressin/100  Current: Slight improvement 43/100  2. Pt will report >/= 50% improvement in symptoms to increase activity/position tolerance.   Status at [facility]: 0%  Status at [facility]: Improved 15-20%   Current;MET: 50% improvement  3. Pt will have 75% of normal lumbar AROM without increased pain to normalize ADL's. Status at eval: ext: limited 75%, B rot: limited 50% with increased pain  Status at  PN: progressing: B rot: limited 25%, ext: limited 50%  Current: Improving    PLAN  []  Upgrade activities as tolerated     []  Continue plan of care  []  Update interventions per flow sheet       [x]  Discharge due to: completion of program with other factors (LE arthritis) limiting progress/tolerance  []  Other:_      Ximena Carney, PT 6/1/2018  1:43 PM    No future appointments.

## 2018-06-06 NOTE — PROGRESS NOTES
[facility] at [address] [place] [address]  Phone: [phone]   Fax: [phone]    Discharge Summary    Patient name: Jake Ibrahim     Start of Care: 18  Referral source: Carlos Veloz MD    : 1940  Medical/Treatment Diagnosis: Low back pain [M54.5]  Onset Date:2018  Prior Hospitalization: see medical history   Provider#: [id number]  Comorbidities: thyroid problems, arthritis, hearing impaired  Prior Level of Function:intermittent, manageable back pain with ADL's  Medications: Verified on Patient Summary List    Visits from Start of Care: 14    Missed Visits: 0  Reporting Period : 18 to 18    Short Term Goals: To be accomplished in 2 weeks:  1. Patient will be independent and compliant with HEP to achieve other goals. Status at eval: issue on 2nd visit  Status at PN: pt reports compliance  Current: self monitored exercise in gym recommended with y pass issued  2. Pt will report >/= 25% improvement in symptoms to increase activity/position tolerance. Status at eval: 0%  Status at PN: Improved 15-20%  Current:50% improvement, MET      Long Term Goals: To be accomplished in 4 weeks:  1. Improve FOTO score to >/= 49/100 to indicate decreased pain with ADL's. Status at eval: 30/100  Status at PN: Progressin/100  Current: Slight improvement 43/100  2. Pt will report >/= 50% improvement in symptoms to increase activity/position tolerance. Status at eval: 0%  Status at [facility]: Improved 15-20%   Current;MET: 50% improvement  3. Pt will have 75% of normal lumbar AROM without increased pain to normalize ADL's. Status at eval: ext: limited 75%, B rot: limited 50% with increased pain  Status at  PN: progressing: B rot: limited 25%, ext: limited 50%  Current: Improving       G-Codes (GP)  Mobility    Goal  CK= 40-59%  D/C  CK= 40-59%    The severity rating is based on clinical judgment and the FOTO score.     Assessment/ Summary of Care: Pt reports improvements in mobility following therex with return to gym recommended. She has other comorbid conditions including \"bone on bone\" arthritis in her hip and knee limiting her overall activity tolerance and function. Treatment included ROM/stretching, manual techniques, and use of estim.     RECOMMENDATIONS:  [x]Discontinue therapy: [x]Patient has reached or is progressing toward set goals      []Patient is non-compliant or has abdicated      []Due to lack of appreciable progress towards set goals    Mony Urena, PT 6/6/2018 11:51 AM

## 2020-03-26 NOTE — PROGRESS NOTES
Note reviewed. PT DAILY TREATMENT NOTE - [facility]     Patient Name: Ashish Munson  Date:2018  : 1940  [x]  Patient  Verified  Payor: Lindy Lim / Plan: [place] / Product Type: Medicare /    In time:228  Out time:320  Total Treatment Time (min): 52  Total Timed Codes (min):37  1:1 Treatment Time ( [address] only): 37   Visit #: 13 of 15    Treatment Area: Low back pain [M54.5]    SUBJECTIVE  Pain Level (0-10 scale): 5/10  Any medication changes, allergies to medications, adverse drug reactions, diagnosis change, or new procedure performed?: [x] No    [] Yes (see summary sheet for update)  Subjective functional status/changes:   [] No changes reported  Pt reports that her knee is really bothering her, left knee and she has a call in to her doctor. She is worried that she is falling apart.     OBJECTIVE    Modality rationale: decrease pain and increase tissue extensibility to improve the patients ability to stand for more than 30 minutes   Min Type Additional Details   15 [x] Estim:  [x]Unatt       [x]IFC  []Premod                        []Other:  []w/ice   [x]w/heat  Position:seated  Location:left hip    [] Estim: []Att    []TENS instruct  []NMES                    []Other:  []w/US   []w/ice   []w/heat  Position:  Location:    []  Traction: [] Cervical       []Lumbar                       [] Prone          []Supine                       []Intermittent   []Continuous Lbs:  [] before manual  [] after manual    []  Ultrasound: []Continuous   [] Pulsed                           []1MHz   []3MHz W/cm2:  Location:    []  Iontophoresis with dexamethasone         Location: [] Take home patch   [] In clinic    []  Ice     []  heat  []  Ice massage  []  Laser   []  Anodyne Position:  Location:    []  Laser with stim  []  Other:  Position:  Location:    []  Vasopneumatic Device Pressure:       [] lo [] med [] hi   Temperature: [] lo [] med [] hi   [x] Skin assessment post-treatment:  [x]intact [x]redness- no adverse reaction    22 min Therapeutic Exercise:  [x] See flow sheet :   Rationale: increase ROM, increase strength, improve coordination, improve balance and increase proprioception to improve the patients ability to stand more than 30 minutes      15 min Manual Therapy:  Myofascial leg pull on left, mFR to left hip and QL   Rationale: decrease pain, increase ROM, increase tissue extensibility, decrease edema  and decrease trigger points to stand more than 30 minutes          With   [] TE   [x] TA   [] neuro   [] other: Patient Education: [x] Review HEP    [] Progressed/Changed HEP based on:   [x] positioning   [x] body mechanics   [] transfers   [] heat/ice application    [] other:      Other Objective/Functional Measures:      Pain Level (0-10 scale) post treatment: 3/10    ASSESSMENT/Changes in Function: Pt demonstrates very restricted left QL contributing to pain and possibly knee issues. Addressed with soft tissue work and gentle stretching that she tolerated followed with IFC. Pt pain reduced at end of treatment. Patient will continue to benefit from skilled PT services to address functional mobility deficits, address ROM deficits, address strength deficits, analyze and address soft tissue restrictions, analyze and cue movement patterns, analyze and modify body mechanics/ergonomics and assess and modify postural abnormalities to attain remaining goals. []  See Plan of Care  []  See progress note/recertification  []  See Discharge Summary         Progress towards goals / Updated goals:  Short Term Goals: To be accomplished in 2 weeks:  1. Patient will be independent and compliant with HEP to achieve other goals. Status at eval: issue on 2nd visit  Status at PN: pt reports compliance  Current: self monitored exercise in gym recommended with y pass issued  2. Pt will report >/= 25% improvement in symptoms to increase activity/position tolerance.   Status at eval: 0%  Status at PN: Improved 15-20%  Current:50% improvement, MET      Long Term Goals: To be accomplished in 4 weeks:  1. Improve FOTO score to >/= 49/100 to indicate decreased pain with ADL's. Status at eval: 30/100  Status at PN: Progressin/100  Current: Slight improvement 43/100  2. Pt will report >/= 50% improvement in symptoms to increase activity/position tolerance. Status at eval: 0%  Status at [facility]: Improved 15-20%   Current;MET: 50% improvement  3. Pt will have 75% of normal lumbar AROM without increased pain to normalize ADL's.   Status at eval: ext: limited 75%, B rot: limited 50% with increased pain  Status at  PN: progressing: B rot: limited 25%, ext: limited 50%  Current: Improving       PLAN  []  Upgrade activities as tolerated     []  Continue plan of care  []  Update interventions per flow sheet       []  Discharge due to:_  []  Other:_      Shorty Sousa PTA 2018  12:16 PM    Future Appointments  Date Time Provider Paul Elam   2018 2:30 PM Shorty Sousa [place] KRANTHI [facility]   2018 3:00 PM LORETTA GuillaumeMIRIAN [facility]   2018 1:30 PM PALMA DaviesMIRIAN [facility]